# Patient Record
Sex: MALE | Race: WHITE | NOT HISPANIC OR LATINO | Employment: OTHER | URBAN - METROPOLITAN AREA
[De-identification: names, ages, dates, MRNs, and addresses within clinical notes are randomized per-mention and may not be internally consistent; named-entity substitution may affect disease eponyms.]

---

## 2017-10-02 ENCOUNTER — APPOINTMENT (OUTPATIENT)
Dept: RADIOLOGY | Facility: MEDICAL CENTER | Age: 82
End: 2017-10-02
Attending: EMERGENCY MEDICINE
Payer: COMMERCIAL

## 2017-10-02 ENCOUNTER — HOSPITAL ENCOUNTER (EMERGENCY)
Facility: MEDICAL CENTER | Age: 82
End: 2017-10-02
Attending: EMERGENCY MEDICINE
Payer: COMMERCIAL

## 2017-10-02 VITALS
DIASTOLIC BLOOD PRESSURE: 46 MMHG | OXYGEN SATURATION: 91 % | SYSTOLIC BLOOD PRESSURE: 127 MMHG | TEMPERATURE: 98.9 F | HEIGHT: 72 IN | RESPIRATION RATE: 19 BRPM | BODY MASS INDEX: 24.99 KG/M2 | WEIGHT: 184.53 LBS | HEART RATE: 83 BPM

## 2017-10-02 DIAGNOSIS — K59.00 CONSTIPATION, UNSPECIFIED CONSTIPATION TYPE: ICD-10-CM

## 2017-10-02 DIAGNOSIS — K62.89 PROCTITIS: ICD-10-CM

## 2017-10-02 LAB
ALBUMIN SERPL BCP-MCNC: 3.7 G/DL (ref 3.2–4.9)
ALBUMIN/GLOB SERPL: 1.2 G/DL
ALP SERPL-CCNC: 52 U/L (ref 30–99)
ALT SERPL-CCNC: 13 U/L (ref 2–50)
ANION GAP SERPL CALC-SCNC: 10 MMOL/L (ref 0–11.9)
APTT PPP: 36.5 SEC (ref 24.7–36)
AST SERPL-CCNC: 22 U/L (ref 12–45)
BASOPHILS # BLD AUTO: 0.4 % (ref 0–1.8)
BASOPHILS # BLD: 0.03 K/UL (ref 0–0.12)
BILIRUB SERPL-MCNC: 0.9 MG/DL (ref 0.1–1.5)
BUN SERPL-MCNC: 23 MG/DL (ref 8–22)
CALCIUM SERPL-MCNC: 9.1 MG/DL (ref 8.5–10.5)
CHLORIDE SERPL-SCNC: 105 MMOL/L (ref 96–112)
CO2 SERPL-SCNC: 20 MMOL/L (ref 20–33)
CREAT SERPL-MCNC: 1.16 MG/DL (ref 0.5–1.4)
EOSINOPHIL # BLD AUTO: 0.08 K/UL (ref 0–0.51)
EOSINOPHIL NFR BLD: 1 % (ref 0–6.9)
ERYTHROCYTE [DISTWIDTH] IN BLOOD BY AUTOMATED COUNT: 48.9 FL (ref 35.9–50)
GFR SERPL CREATININE-BSD FRML MDRD: 60 ML/MIN/1.73 M 2
GLOBULIN SER CALC-MCNC: 3 G/DL (ref 1.9–3.5)
GLUCOSE SERPL-MCNC: 89 MG/DL (ref 65–99)
HCT VFR BLD AUTO: 34.9 % (ref 42–52)
HGB BLD-MCNC: 11.9 G/DL (ref 14–18)
IMM GRANULOCYTES # BLD AUTO: 0.04 K/UL (ref 0–0.11)
IMM GRANULOCYTES NFR BLD AUTO: 0.5 % (ref 0–0.9)
INR PPP: 1.07 (ref 0.87–1.13)
LIPASE SERPL-CCNC: 6 U/L (ref 11–82)
LYMPHOCYTES # BLD AUTO: 0.75 K/UL (ref 1–4.8)
LYMPHOCYTES NFR BLD: 9.4 % (ref 22–41)
MCH RBC QN AUTO: 33.5 PG (ref 27–33)
MCHC RBC AUTO-ENTMCNC: 34.1 G/DL (ref 33.7–35.3)
MCV RBC AUTO: 98.3 FL (ref 81.4–97.8)
MONOCYTES # BLD AUTO: 0.66 K/UL (ref 0–0.85)
MONOCYTES NFR BLD AUTO: 8.3 % (ref 0–13.4)
NEUTROPHILS # BLD AUTO: 6.4 K/UL (ref 1.82–7.42)
NEUTROPHILS NFR BLD: 80.4 % (ref 44–72)
NRBC # BLD AUTO: 0 K/UL
NRBC BLD AUTO-RTO: 0 /100 WBC
PLATELET # BLD AUTO: 148 K/UL (ref 164–446)
PMV BLD AUTO: 11.2 FL (ref 9–12.9)
POTASSIUM SERPL-SCNC: 4 MMOL/L (ref 3.6–5.5)
PROT SERPL-MCNC: 6.7 G/DL (ref 6–8.2)
PROTHROMBIN TIME: 14.2 SEC (ref 12–14.6)
RBC # BLD AUTO: 3.55 M/UL (ref 4.7–6.1)
SODIUM SERPL-SCNC: 135 MMOL/L (ref 135–145)
WBC # BLD AUTO: 8 K/UL (ref 4.8–10.8)

## 2017-10-02 PROCEDURE — 83690 ASSAY OF LIPASE: CPT

## 2017-10-02 PROCEDURE — A9270 NON-COVERED ITEM OR SERVICE: HCPCS | Performed by: EMERGENCY MEDICINE

## 2017-10-02 PROCEDURE — 302255 BARRIER CREAM MOISTURE BAZA PROTECT (ZINC) 5OZ: Performed by: EMERGENCY MEDICINE

## 2017-10-02 PROCEDURE — 36415 COLL VENOUS BLD VENIPUNCTURE: CPT

## 2017-10-02 PROCEDURE — 85025 COMPLETE CBC W/AUTO DIFF WBC: CPT

## 2017-10-02 PROCEDURE — 99284 EMERGENCY DEPT VISIT MOD MDM: CPT

## 2017-10-02 PROCEDURE — 85610 PROTHROMBIN TIME: CPT

## 2017-10-02 PROCEDURE — 700102 HCHG RX REV CODE 250 W/ 637 OVERRIDE(OP): Performed by: EMERGENCY MEDICINE

## 2017-10-02 PROCEDURE — 700105 HCHG RX REV CODE 258: Performed by: EMERGENCY MEDICINE

## 2017-10-02 PROCEDURE — 82272 OCCULT BLD FECES 1-3 TESTS: CPT

## 2017-10-02 PROCEDURE — 80053 COMPREHEN METABOLIC PANEL: CPT

## 2017-10-02 PROCEDURE — 85730 THROMBOPLASTIN TIME PARTIAL: CPT

## 2017-10-02 PROCEDURE — 700101 HCHG RX REV CODE 250: Performed by: EMERGENCY MEDICINE

## 2017-10-02 PROCEDURE — 74022 RADEX COMPL AQT ABD SERIES: CPT

## 2017-10-02 RX ORDER — ENEMA 19; 7 G/133ML; G/133ML
1 ENEMA RECTAL ONCE
Status: COMPLETED | OUTPATIENT
Start: 2017-10-02 | End: 2017-10-02

## 2017-10-02 RX ORDER — SODIUM CHLORIDE 9 MG/ML
INJECTION, SOLUTION INTRAVENOUS CONTINUOUS
Status: DISCONTINUED | OUTPATIENT
Start: 2017-10-02 | End: 2017-10-02 | Stop reason: HOSPADM

## 2017-10-02 RX ORDER — ONDANSETRON 2 MG/ML
INJECTION INTRAMUSCULAR; INTRAVENOUS
Status: DISCONTINUED
Start: 2017-10-02 | End: 2017-10-02 | Stop reason: HOSPADM

## 2017-10-02 RX ORDER — ONDANSETRON 2 MG/ML
4 INJECTION INTRAMUSCULAR; INTRAVENOUS ONCE
Status: DISCONTINUED | OUTPATIENT
Start: 2017-10-02 | End: 2017-10-02

## 2017-10-02 RX ADMIN — SODIUM PHOSPHATE, DIBASIC AND SODIUM PHOSPHATE, MONOBASIC 133 ML: 7; 19 ENEMA RECTAL at 14:33

## 2017-10-02 RX ADMIN — LIDOCAINE HYDROCHLORIDE 1 APPLICATION: 20 JELLY TOPICAL at 10:27

## 2017-10-02 RX ADMIN — MAGNESIUM CITRATE 296 ML: 1.75 LIQUID ORAL at 12:00

## 2017-10-02 RX ADMIN — LIDOCAINE HYDROCHLORIDE 1 APPLICATION: 20 JELLY TOPICAL at 14:15

## 2017-10-02 RX ADMIN — SODIUM PHOSPHATE 133 ML: 7; 19 ENEMA RECTAL at 11:04

## 2017-10-02 RX ADMIN — SODIUM CHLORIDE: 9 INJECTION, SOLUTION INTRAVENOUS at 13:17

## 2017-10-02 NOTE — DISCHARGE INSTRUCTIONS
Constipation, Adult  Constipation is when a person:  · Poops (has a bowel movement) less than 3 times a week.  · Has a hard time pooping.  · Has poop that is dry, hard, or bigger than normal.  HOME CARE   · Eat foods with a lot of fiber in them. This includes fruits, vegetables, beans, and whole grains such as brown rice.  · Avoid fatty foods and foods with a lot of sugar. This includes french fries, hamburgers, cookies, candy, and soda.  · If you are not getting enough fiber from food, take products with added fiber in them (supplements).  · Drink enough fluid to keep your pee (urine) clear or pale yellow.  · Exercise on a regular basis, or as told by your doctor.  · Go to the restroom when you feel like you need to poop. Do not hold it.  · Only take medicine as told by your doctor. Do not take medicines that help you poop (laxatives) without talking to your doctor first.  GET HELP RIGHT AWAY IF:   · You have bright red blood in your poop (stool).  · Your constipation lasts more than 4 days or gets worse.  · You have belly (abdominal) or butt (rectal) pain.  · You have thin poop (as thin as a pencil).  · You lose weight, and it cannot be explained.  MAKE SURE YOU:   · Understand these instructions.  · Will watch your condition.  · Will get help right away if you are not doing well or get worse.     This information is not intended to replace advice given to you by your health care provider. Make sure you discuss any questions you have with your health care provider.     Document Released: 06/05/2009 Document Revised: 01/08/2016 Document Reviewed: 09/29/2014  [employer] Interactive Patient Education ©2016 [employer]      Proctitis  Proctitis is the swelling and soreness (inflammation) of the lining of the rectum. The rectum is at the end of the large intestine and is attached to the anus. The inflammation causes pain and discomfort. It may be short-term (acute) or long-lasting (chronic).  CAUSES  Inflammation in  the rectum can be caused by many things, such as:  · Sexually transmitted diseases (STDs).  · Infection.  · Anal-rectal trauma or injury.  · Ulcerative colitis or Crohn's disease.  · Radiation therapy directed near the rectum.  · Antibiotic therapy.  SYMPTOMS  · Sudden, uncomfortable, and frequent urge to have a bowel movement.  · Anal or rectal pain.  · Abdominal cramping or pain.  · Sensation that the rectum is full.  · Rectal bleeding.  · Pus or mucus discharge from anus.  · Diarrhea or frequent soft, loose stools.  DIAGNOSIS  Diagnosis may include the following:  · A history and physical exam.  · An STD test.  · Blood tests.  · Stool tests.  · Rectal culture.  · A procedure to evaluate the anal canal (anoscopy).  · Procedures to look at part, or the entire large bowel (sigmoidoscopy, colonoscopy).  TREATMENT  Treatment of proctitis depends on the cause. Reducing the symptoms of inflammation and eliminating infection are the main goals of treatment. Treatment may include:  · Home remedies and lifestyle, such as sitz baths and avoiding food right before bedtime.  · Topical ointments, foams, suppositories, or enemas, such as corticosteroids or anti-inflammatories.  · Antibiotic or antiviral medicines to treat infection or to control harmful bacteria.  · Medicines to control diarrhea, soften stools, and reduce pain.  · Medicines to suppress the immune system.  · Avoiding the activity that caused rectal trauma.  · Nutritional, dietary, or herbal supplements.  · Heat or laser therapy for persistent bleeding.  · A dilation procedure to enlarge a narrowed rectum.  · Surgery, though rare, may be necessary to repair damaged rectal lining.  If your proctitis was caused by an STD, your health care provider may test you for infection again 3 months after treatment.  HOME CARE INSTRUCTIONS  Only take medicines that are recommended or approved by your caregiver. Do not take anti-diarrhea medicine without your caregiver's  approval.  SEEK MEDICAL CARE IF:  · You often experience one or more of the symptoms noted above.  · You keep experiencing symptoms after treatment.  · You have questions or concerns about your symptoms or treatment plan.  MAKE SURE YOU:  · Understand these instructions.  · Will watch your condition.  · Will get help right away if you are not doing well or get worse.  FOR MORE INFORMATION  National [place] of Diabetes and Digestive and Kidney Disease (NIDDK): www.digestive.niddk.[facility].gov/     This information is not intended to replace advice given to you by your health care provider. Make sure you discuss any questions you have with your health care provider.     Document Released: 12/06/2012 Document Revised: 01/08/2016 Document Reviewed: 03/14/2016  [employer] Interactive Patient Education ©2016 [employer]

## 2017-10-02 NOTE — ED PROVIDER NOTES
ED Provider Note    Scribed for Krista Flores M.D. by Pipo Valenzuela. 10/2/2017  8:53 AM    Primary care provider: Nestor Jiang M.D.  Means of arrival: Walk-in  History obtained from: Patient and wife.   History limited by: None    CHIEF COMPLAINT  Chief Complaint   Patient presents with   • Rectal Pain     reports symptoms for three days/ pt reports started as constipation/ took mirilax and now having diarrhea/ rectal pain   • Constipation     HPI  Rc Auguste is a 85 y.o. male who presents to the Emergency Department for rectal pain and discharge onset 5 days ago. The patient states he was constipated for 2 days onset 7 days ago and medicated with Miralax without relief. He began taking Exlax afterwards and immediately began developing incontinence from the rectum that he describes as watery discharge with small amounts of stool. He has associated rectal pain after wiping, but denies any itching or blood after wiping. He also notes of lower abdominal pains to palpation, but otherwise denies any fever, diarrhea, dysuria, nausea, vomiting, chills, chest pain, shortness of breath, or abdominal pain. He has never experienced similar symptoms previously and has not medicated at home for pain relief. 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 He has past abdominal surgery of left hernia repair many years ago and appendectomy as a child. He has past history of A-fib, takes Amiodarone, and is seen by Dr. Deleon (Cardiology). He does not take any Aspirin. He does not have past history of hypertension, diabetes, or cancer. His last colonoscopy was 2 years ago, which was normal. He is a former smoker and drinks alcohol intermittently. He does not use illicit drugs. The patient was in the [employer] and denies any exposure to [place].     REVIEW OF SYSTEMS  CARDIAC: no chest pain  PULMONARY: no shortness of breath.   GI: Abdominal pain, no  vomiting, diarrhea.  : Rectal pain and discharge, constipation. No dysuria.   Musculoskeletal: Left rib pain.   Endocrine: no fevers, chills.     See history of present illness. All other systems are negative.  C.    PAST MEDICAL HISTORY   has a past medical history of Arrhythmia; Back pain; and Constipation.    SURGICAL HISTORY   has a past surgical history that includes other orthopedic surgery; other cardiac surgery; colonoscopy - endo (4/16/2011); lumbar laminectomy diskectomy (2014); and knee replacement, total (Right).    SOCIAL HISTORY  Social History   Substance Use Topics   • Smoking status: Former Smoker     Packs/day: 2.00     Years: 40.00     Types: Cigarettes     Quit date: 1/1/1966   • Smokeless tobacco: Never Used   • Alcohol use No      History   Drug Use No       FAMILY HISTORY  Family History   Problem Relation Age of Onset   • Cancer Mother      Liver CAncer   • Heart Disease Father        CURRENT MEDICATIONS  Home Medications     Reviewed by Katie Clifford R.N. (Registered Nurse) on 10/02/17 at 0852  Med List Status: Complete   Medication Last Dose Status   amiodarone (CORDARONE) 200 MG TABS 10/1/2017 Active   Ascorbic Acid (VITAMIN C) 500 MG CHEW 10/1/2017 Active   Calcium Carbonate-Vit D-Min (CALCIUM 1200 PO) 10/1/2017 Active   docosahexanoic acid (OMEGA 3) 1000 MG CAPS 10/1/2017 Active   Multiple Vitamin (MULTI-VITAMIN PO) 10/1/2017 Active                ALLERGIES  Allergies   Allergen Reactions   • Nkda [No Known Drug Allergy]        PHYSICAL EXAM  VITAL SIGNS: /46   Pulse 96   Temp 37.2 °C (98.9 °F)   Resp 18   Ht 1.829 m (6')   Wt 83.7 kg (184 lb 8.4 oz)   SpO2 96%   BMI 25.03 kg/m²     Constitutional: Well developed, Well nourished, No acute distress, Non-toxic appearance.   HEENT: Normocephalic, Atraumatic,  external ears normal, pharynx pink,  Mucous  Membranes moist, No rhinorrhea or mucosal edema  Eyes: PERRL, EOMI, Conjunctiva normal, No discharge.   Neck: Normal  range of motion, No tenderness, Supple, No stridor.   Lymphatic: No lymphadenopathy    Cardiovascular: Regular Rate and Rhythm, No murmurs,  rubs, or gallops.   Thorax & Lungs: Lungs clear to auscultation bilaterally, No respiratory distress, No wheezes, rhales or rhonchi, No chest wall tenderness.   Abdomen: Bowel sounds normal, Soft, suprapubic tenderness to bilateral lower quadrants with deep palpation, non distended,  No pulsatile masses., no rebound guarding or peritoneal signs.   Skin: Warm, Dry, see rectal exam   Rectal Exam: Noted external hemorrhoids,, inflamed perianal area, rectum is fully loaded with fecal matter. Hemoccult positive.  Back:  No CVA tenderness,  No spinal tenderness, bony crepitance, step offs, or instability.   Neurologic: Alert & oriented x 3, Normal motor function, Normal sensory function, No focal deficits noted. Normal reflexes. Normal Cranial Nerves.  Extremities: Equal, intact distal pulses, No cyanosis, clubbing or edema,  No tenderness.   Musculoskeletal: Good range of motion in all major joints. No tenderness to palpation or major deformities noted.     DIAGNOSTIC STUDIES / PROCEDURES    LABS  Results for orders placed or performed during the hospital encounter of 10/02/17   CBC WITH DIFFERENTIAL   Result Value Ref Range    WBC 8.0 4.8 - 10.8 K/uL    RBC 3.55 (L) 4.70 - 6.10 M/uL    Hemoglobin 11.9 (L) 14.0 - 18.0 g/dL    Hematocrit 34.9 (L) 42.0 - 52.0 %    MCV 98.3 (H) 81.4 - 97.8 fL    MCH 33.5 (H) 27.0 - 33.0 pg    MCHC 34.1 33.7 - 35.3 g/dL    RDW 48.9 35.9 - 50.0 fL    Platelet Count 148 (L) 164 - 446 K/uL    MPV 11.2 9.0 - 12.9 fL    Neutrophils-Polys 80.40 (H) 44.00 - 72.00 %    Lymphocytes 9.40 (L) 22.00 - 41.00 %    Monocytes 8.30 0.00 - 13.40 %    Eosinophils 1.00 0.00 - 6.90 %    Basophils 0.40 0.00 - 1.80 %    Immature Granulocytes 0.50 0.00 - 0.90 %    Nucleated RBC 0.00 /100 WBC    Neutrophils (Absolute) 6.40 1.82 - 7.42 K/uL    Lymphs (Absolute) 0.75 (L) 1.00 -  4.80 K/uL    Monos (Absolute) 0.66 0.00 - 0.85 K/uL    Eos (Absolute) 0.08 0.00 - 0.51 K/uL    Baso (Absolute) 0.03 0.00 - 0.12 K/uL    Immature Granulocytes (abs) 0.04 0.00 - 0.11 K/uL    NRBC (Absolute) 0.00 K/uL   COMP METABOLIC PANEL   Result Value Ref Range    Sodium 135 135 - 145 mmol/L    Potassium 4.0 3.6 - 5.5 mmol/L    Chloride 105 96 - 112 mmol/L    Co2 20 20 - 33 mmol/L    Anion Gap 10.0 0.0 - 11.9    Glucose 89 65 - 99 mg/dL    Bun 23 (H) 8 - 22 mg/dL    Creatinine 1.16 0.50 - 1.40 mg/dL    Calcium 9.1 8.5 - 10.5 mg/dL    AST(SGOT) 22 12 - 45 U/L    ALT(SGPT) 13 2 - 50 U/L    Alkaline Phosphatase 52 30 - 99 U/L    Total Bilirubin 0.9 0.1 - 1.5 mg/dL    Albumin 3.7 3.2 - 4.9 g/dL    Total Protein 6.7 6.0 - 8.2 g/dL    Globulin 3.0 1.9 - 3.5 g/dL    A-G Ratio 1.2 g/dL   LIPASE   Result Value Ref Range    Lipase 6 (L) 11 - 82 U/L   PROTHROMBIN TIME (INR)   Result Value Ref Range    PT 14.2 12.0 - 14.6 sec    INR 1.07 0.87 - 1.13   APTT   Result Value Ref Range    APTT 36.5 (H) 24.7 - 36.0 sec   ESTIMATED GFR   Result Value Ref Range    GFR If African American >60 >60 mL/min/1.73 m 2    GFR If Non African American 60 >60 mL/min/1.73 m 2   All labs reviewed by me.    RADIOLOGY  DX-ABDOMEN COMPLETE WITH AP OR PA CXR   Final Result      No evidence of obstruction, perforation or ileus.      Large amount of stool within the colon.      The radiologist's interpretation of all radiological studies have been reviewed by me.    COURSE & MEDICAL DECISION MAKING  Nursing notes, VS, PMSFHx reviewed in chart.    8:55 AM - Patient seen and examined at bedside by Resident Dr. Garcia. Patient will be treated with normal saline infusion for dehydration indicated by poor PO intake and diarrhea, fleet enema, and lidocaine jelly. Ordered DX-abdomen, CBC, CMP, lipase, urinalysis culture, prothrombin time, and APTT to evaluate his symptoms. The differential diagnoses include but are not limited to: constipation,  hemorrhoids, fissures, rectal colitis. Performed rectal exam, see above note for more details.     10:13 AM Patient was reevaluated at bedside by myself. Reviewed the patient's imaging result as shown above. Informed him and his wife regarding plan of care, which they understand and agree with.    11:41 AM Nursing staff informed me the patient has not had a bowel movement yet. He will be treated with magnesium citrate.     12:41 PM Reviewed the patient's lab results as shown above. Patient was reevaluated at bedside. He is resting comfortably in bed, but still has not had a bowel movement. Discussed lab results with the patient and informed them of the results shown above. With nursing assistance, disimpaction was performed with removal of some soft stool    12:45 PM Paged GI.    1:15 PM 1:23 PM I discussed the patient's case and the above findings with Dr. Darby (GI) who recommended the patient continue to receive suppositories and take GoLytely to relieve the constipation. He has agreed to follow up with the patient in his office.      1:23 PM Patient was reevaluated at bedside. Discussed discharged plan of care with the patient and family members, including my consult with Dr. Darby. The patient will be discharged with Lidocaine gel, More-med, and Golytely and should return if symptoms worsen or if new symptoms arise, such as fever or vomiting. The patient understands and agrees to plan.      Patient has had high blood pressure while in the emergency department, felt likely secondary to medical condition. Counseled patient to monitor blood pressure at home and follow up with primary care physician.     I independently evaluated the patient and repeated the important components of the history and physical.  I discussed the management with the resident.  I have reviewed and agree with the pertinent clinical information as above including history, exam, study findings and recommendations.     DISPOSITION:  Patient  will be discharged home in stable condition.    FOLLOW UP:  Simon Baker M.D.  [address] [place] [id number] [phone]    Call in 1 day  for recheck, to establish care      OUTPATIENT MEDICATIONS:  New Prescriptions    LIDOCAINE (XYLOCAINE) 2 % GEL    Apply 1 Application to affected area(s) as needed.    PHENYLEPHRINE (VICTORINA-MED) 0.25 % SUPPOSITORY    Insert 1 Suppository in rectum every 12 hours.    POLYETHYLENE GLYCOL-ELECTROLYTES (GOLYTELY) 236 GM RECON SOLN    Sip throughout today      FINAL IMPRESSION  1. Proctitis    2. Constipation, unspecified constipation type         I, Pipo Valenzuela (Scribe), am scribing for, and in the presence of, Krista Flores M.D..    Electronically signed by: Pipo Valenzuela (Alesia), 10/2/2017    IKrista M.D. personally performed the services described in this documentation, as scribed by Pipo Valenzuela in my presence, and it is both accurate and complete.    The note accurately reflects work and decisions made by me.  Krista Flores  10/2/2017  3:10 PM

## 2018-08-30 ENCOUNTER — HOSPITAL ENCOUNTER (OUTPATIENT)
Dept: LAB | Facility: MEDICAL CENTER | Age: 83
End: 2018-08-30
Attending: INTERNAL MEDICINE
Payer: COMMERCIAL

## 2018-08-30 LAB
ALBUMIN SERPL BCP-MCNC: 4.1 G/DL (ref 3.2–4.9)
ALBUMIN/GLOB SERPL: 1.4 G/DL
ALP SERPL-CCNC: 69 U/L (ref 30–99)
ALT SERPL-CCNC: 9 U/L (ref 2–50)
ANION GAP SERPL CALC-SCNC: 9 MMOL/L (ref 0–11.9)
AST SERPL-CCNC: 13 U/L (ref 12–45)
BASOPHILS # BLD AUTO: 1 % (ref 0–1.8)
BASOPHILS # BLD: 0.05 K/UL (ref 0–0.12)
BILIRUB SERPL-MCNC: 0.5 MG/DL (ref 0.1–1.5)
BUN SERPL-MCNC: 24 MG/DL (ref 8–22)
CALCIUM SERPL-MCNC: 9.3 MG/DL (ref 8.5–10.5)
CHLORIDE SERPL-SCNC: 106 MMOL/L (ref 96–112)
CHOLEST SERPL-MCNC: 174 MG/DL (ref 100–199)
CO2 SERPL-SCNC: 24 MMOL/L (ref 20–33)
CREAT SERPL-MCNC: 1.22 MG/DL (ref 0.5–1.4)
EOSINOPHIL # BLD AUTO: 0.12 K/UL (ref 0–0.51)
EOSINOPHIL NFR BLD: 2.4 % (ref 0–6.9)
ERYTHROCYTE [DISTWIDTH] IN BLOOD BY AUTOMATED COUNT: 50.9 FL (ref 35.9–50)
GLOBULIN SER CALC-MCNC: 3 G/DL (ref 1.9–3.5)
GLUCOSE SERPL-MCNC: 90 MG/DL (ref 65–99)
HCT VFR BLD AUTO: 40.8 % (ref 42–52)
HDLC SERPL-MCNC: 35 MG/DL
HGB BLD-MCNC: 13.6 G/DL (ref 14–18)
IMM GRANULOCYTES # BLD AUTO: 0.04 K/UL (ref 0–0.11)
IMM GRANULOCYTES NFR BLD AUTO: 0.8 % (ref 0–0.9)
LDLC SERPL CALC-MCNC: 118 MG/DL
LYMPHOCYTES # BLD AUTO: 0.87 K/UL (ref 1–4.8)
LYMPHOCYTES NFR BLD: 17.2 % (ref 22–41)
MCH RBC QN AUTO: 33.4 PG (ref 27–33)
MCHC RBC AUTO-ENTMCNC: 33.3 G/DL (ref 33.7–35.3)
MCV RBC AUTO: 100.2 FL (ref 81.4–97.8)
MONOCYTES # BLD AUTO: 0.45 K/UL (ref 0–0.85)
MONOCYTES NFR BLD AUTO: 8.9 % (ref 0–13.4)
NEUTROPHILS # BLD AUTO: 3.52 K/UL (ref 1.82–7.42)
NEUTROPHILS NFR BLD: 69.7 % (ref 44–72)
NRBC # BLD AUTO: 0 K/UL
NRBC BLD-RTO: 0 /100 WBC
PLATELET # BLD AUTO: 167 K/UL (ref 164–446)
PMV BLD AUTO: 11.2 FL (ref 9–12.9)
POTASSIUM SERPL-SCNC: 4 MMOL/L (ref 3.6–5.5)
PROT SERPL-MCNC: 7.1 G/DL (ref 6–8.2)
RBC # BLD AUTO: 4.07 M/UL (ref 4.7–6.1)
SODIUM SERPL-SCNC: 139 MMOL/L (ref 135–145)
TRIGL SERPL-MCNC: 106 MG/DL (ref 0–149)
TSH SERPL DL<=0.005 MIU/L-ACNC: 9.17 UIU/ML (ref 0.38–5.33)
WBC # BLD AUTO: 5.1 K/UL (ref 4.8–10.8)

## 2018-08-30 PROCEDURE — 85025 COMPLETE CBC W/AUTO DIFF WBC: CPT

## 2018-08-30 PROCEDURE — 36415 COLL VENOUS BLD VENIPUNCTURE: CPT

## 2018-08-30 PROCEDURE — 80061 LIPID PANEL: CPT

## 2018-08-30 PROCEDURE — 80053 COMPREHEN METABOLIC PANEL: CPT

## 2018-08-30 PROCEDURE — 84443 ASSAY THYROID STIM HORMONE: CPT

## 2018-09-28 ENCOUNTER — HOSPITAL ENCOUNTER (OUTPATIENT)
Dept: LAB | Facility: MEDICAL CENTER | Age: 83
End: 2018-09-28
Attending: INTERNAL MEDICINE
Payer: COMMERCIAL

## 2018-09-28 PROCEDURE — 84439 ASSAY OF FREE THYROXINE: CPT

## 2018-09-28 PROCEDURE — 84443 ASSAY THYROID STIM HORMONE: CPT

## 2018-09-28 PROCEDURE — 36415 COLL VENOUS BLD VENIPUNCTURE: CPT

## 2018-09-29 LAB
T4 FREE SERPL-MCNC: 1.12 NG/DL (ref 0.53–1.43)
TSH SERPL DL<=0.005 MIU/L-ACNC: 6.17 UIU/ML (ref 0.38–5.33)

## 2018-12-03 ENCOUNTER — HOSPITAL ENCOUNTER (OUTPATIENT)
Dept: LAB | Facility: MEDICAL CENTER | Age: 83
End: 2018-12-03
Attending: INTERNAL MEDICINE
Payer: COMMERCIAL

## 2018-12-03 LAB
T4 FREE SERPL-MCNC: 1.2 NG/DL (ref 0.53–1.43)
TSH SERPL DL<=0.005 MIU/L-ACNC: 4.33 UIU/ML (ref 0.38–5.33)

## 2018-12-03 PROCEDURE — 36415 COLL VENOUS BLD VENIPUNCTURE: CPT

## 2018-12-03 PROCEDURE — 84443 ASSAY THYROID STIM HORMONE: CPT

## 2018-12-03 PROCEDURE — 84439 ASSAY OF FREE THYROXINE: CPT

## 2019-03-12 DIAGNOSIS — Z01.812 PRE-PROCEDURAL LABORATORY EXAMINATION: ICD-10-CM

## 2019-03-12 DIAGNOSIS — Z01.810 PRE-OPERATIVE CARDIOVASCULAR EXAMINATION: ICD-10-CM

## 2019-03-12 LAB
ANION GAP SERPL CALC-SCNC: 12 MMOL/L (ref 0–11.9)
BASOPHILS # BLD AUTO: 0.9 % (ref 0–1.8)
BASOPHILS # BLD: 0.06 K/UL (ref 0–0.12)
BUN SERPL-MCNC: 27 MG/DL (ref 8–22)
CALCIUM SERPL-MCNC: 10.1 MG/DL (ref 8.5–10.5)
CHLORIDE SERPL-SCNC: 107 MMOL/L (ref 96–112)
CO2 SERPL-SCNC: 22 MMOL/L (ref 20–33)
CREAT SERPL-MCNC: 1.24 MG/DL (ref 0.5–1.4)
EKG IMPRESSION: NORMAL
EOSINOPHIL # BLD AUTO: 0.09 K/UL (ref 0–0.51)
EOSINOPHIL NFR BLD: 1.3 % (ref 0–6.9)
ERYTHROCYTE [DISTWIDTH] IN BLOOD BY AUTOMATED COUNT: 52.5 FL (ref 35.9–50)
GLUCOSE SERPL-MCNC: 85 MG/DL (ref 65–99)
HCT VFR BLD AUTO: 41.7 % (ref 42–52)
HGB BLD-MCNC: 13.9 G/DL (ref 14–18)
IMM GRANULOCYTES # BLD AUTO: 0.04 K/UL (ref 0–0.11)
IMM GRANULOCYTES NFR BLD AUTO: 0.6 % (ref 0–0.9)
LYMPHOCYTES # BLD AUTO: 1.18 K/UL (ref 1–4.8)
LYMPHOCYTES NFR BLD: 17.2 % (ref 22–41)
MCH RBC QN AUTO: 33.7 PG (ref 27–33)
MCHC RBC AUTO-ENTMCNC: 33.3 G/DL (ref 33.7–35.3)
MCV RBC AUTO: 101.2 FL (ref 81.4–97.8)
MONOCYTES # BLD AUTO: 0.56 K/UL (ref 0–0.85)
MONOCYTES NFR BLD AUTO: 8.2 % (ref 0–13.4)
NEUTROPHILS # BLD AUTO: 4.92 K/UL (ref 1.82–7.42)
NEUTROPHILS NFR BLD: 71.8 % (ref 44–72)
NRBC # BLD AUTO: 0 K/UL
NRBC BLD-RTO: 0 /100 WBC
PLATELET # BLD AUTO: 177 K/UL (ref 164–446)
PMV BLD AUTO: 10.9 FL (ref 9–12.9)
POTASSIUM SERPL-SCNC: 3.9 MMOL/L (ref 3.6–5.5)
RBC # BLD AUTO: 4.12 M/UL (ref 4.7–6.1)
SCCMEC + MECA PNL NOSE NAA+PROBE: NEGATIVE
SCCMEC + MECA PNL NOSE NAA+PROBE: POSITIVE
SODIUM SERPL-SCNC: 141 MMOL/L (ref 135–145)
WBC # BLD AUTO: 6.9 K/UL (ref 4.8–10.8)

## 2019-03-12 PROCEDURE — 87641 MR-STAPH DNA AMP PROBE: CPT

## 2019-03-12 PROCEDURE — 93005 ELECTROCARDIOGRAM TRACING: CPT

## 2019-03-12 PROCEDURE — 80048 BASIC METABOLIC PNL TOTAL CA: CPT

## 2019-03-12 PROCEDURE — 36415 COLL VENOUS BLD VENIPUNCTURE: CPT

## 2019-03-12 PROCEDURE — 87640 STAPH A DNA AMP PROBE: CPT

## 2019-03-12 PROCEDURE — 93010 ELECTROCARDIOGRAM REPORT: CPT | Performed by: INTERNAL MEDICINE

## 2019-03-12 PROCEDURE — 85025 COMPLETE CBC W/AUTO DIFF WBC: CPT

## 2019-03-12 RX ORDER — LEVOTHYROXINE SODIUM 0.05 MG/1
50 TABLET ORAL
COMMUNITY

## 2019-03-12 NOTE — DISCHARGE PLANNING
DISCHARGE PLANNING NOTE - TOTAL JOINT     Procedure: Procedure(s):  KNEE ARTHROPLASTY TOTAL  Procedure Date: 3/20/2019  Insurance:  Payor: [employer] / Plan: [employer] MEDICARE ADVANTAGE   Equipment currently available at home? cane, front-wheel walker and raised toilet seat  Steps into the home? 2  Steps within the home? 1  Toilet height? Standard  Type of shower? tub-shower  Who will be with you during your recovery? spouse  Is Outpatient Physical Therapy set up after surgery? Yes  Did you take the Total Joint Class and where? No, declined written information. Previous joint replacements.      Plan: Reviewed Equipment Resource list and provided a copy to the patient. Recommended a tub transfer bench. Provided Home Safety Checklist. Anticipate discharge home.

## 2019-03-12 NOTE — OR NURSING
During pre admit appointment today pt denied any symptoms of burning with urination or urinary frequency, UA not indicated.  Patient verbalized an understanding that Dr. Mary's office will notify him if the nasal done today is positive.

## 2019-03-19 ENCOUNTER — ANESTHESIA EVENT (OUTPATIENT)
Dept: SURGERY | Facility: MEDICAL CENTER | Age: 84
End: 2019-03-19
Payer: COMMERCIAL

## 2019-03-20 ENCOUNTER — HOSPITAL ENCOUNTER (OUTPATIENT)
Facility: MEDICAL CENTER | Age: 84
End: 2019-03-21
Attending: ORTHOPAEDIC SURGERY | Admitting: ORTHOPAEDIC SURGERY
Payer: COMMERCIAL

## 2019-03-20 ENCOUNTER — ANESTHESIA (OUTPATIENT)
Dept: SURGERY | Facility: MEDICAL CENTER | Age: 84
End: 2019-03-20
Payer: COMMERCIAL

## 2019-03-20 ENCOUNTER — APPOINTMENT (OUTPATIENT)
Dept: RADIOLOGY | Facility: MEDICAL CENTER | Age: 84
End: 2019-03-20
Attending: PHYSICIAN ASSISTANT
Payer: COMMERCIAL

## 2019-03-20 DIAGNOSIS — Z96.652 STATUS POST LEFT KNEE REPLACEMENT: Chronic | ICD-10-CM

## 2019-03-20 LAB
ABO GROUP BLD: NORMAL
BLD GP AB SCN SERPL QL: NORMAL
RH BLD: NORMAL

## 2019-03-20 PROCEDURE — 160022 HCHG BLOCK: Performed by: ORTHOPAEDIC SURGERY

## 2019-03-20 PROCEDURE — A9270 NON-COVERED ITEM OR SERVICE: HCPCS | Performed by: PHYSICIAN ASSISTANT

## 2019-03-20 PROCEDURE — 96375 TX/PRO/DX INJ NEW DRUG ADDON: CPT

## 2019-03-20 PROCEDURE — 700102 HCHG RX REV CODE 250 W/ 637 OVERRIDE(OP): Performed by: PHYSICIAN ASSISTANT

## 2019-03-20 PROCEDURE — 700111 HCHG RX REV CODE 636 W/ 250 OVERRIDE (IP)

## 2019-03-20 PROCEDURE — 700111 HCHG RX REV CODE 636 W/ 250 OVERRIDE (IP): Performed by: PHYSICIAN ASSISTANT

## 2019-03-20 PROCEDURE — 502000 HCHG MISC OR IMPLANTS RC 0278: Performed by: ORTHOPAEDIC SURGERY

## 2019-03-20 PROCEDURE — 700111 HCHG RX REV CODE 636 W/ 250 OVERRIDE (IP): Performed by: ORTHOPAEDIC SURGERY

## 2019-03-20 PROCEDURE — 160041 HCHG SURGERY MINUTES - EA ADDL 1 MIN LEVEL 4: Performed by: ORTHOPAEDIC SURGERY

## 2019-03-20 PROCEDURE — 501838 HCHG SUTURE GENERAL: Performed by: ORTHOPAEDIC SURGERY

## 2019-03-20 PROCEDURE — 86900 BLOOD TYPING SEROLOGIC ABO: CPT

## 2019-03-20 PROCEDURE — 86901 BLOOD TYPING SEROLOGIC RH(D): CPT

## 2019-03-20 PROCEDURE — G0378 HOSPITAL OBSERVATION PER HR: HCPCS

## 2019-03-20 PROCEDURE — 96365 THER/PROPH/DIAG IV INF INIT: CPT | Mod: XU

## 2019-03-20 PROCEDURE — 700101 HCHG RX REV CODE 250: Performed by: PHYSICIAN ASSISTANT

## 2019-03-20 PROCEDURE — 160029 HCHG SURGERY MINUTES - 1ST 30 MINS LEVEL 4: Performed by: ORTHOPAEDIC SURGERY

## 2019-03-20 PROCEDURE — 160009 HCHG ANES TIME/MIN: Performed by: ORTHOPAEDIC SURGERY

## 2019-03-20 PROCEDURE — 700112 HCHG RX REV CODE 229: Performed by: PHYSICIAN ASSISTANT

## 2019-03-20 PROCEDURE — 160048 HCHG OR STATISTICAL LEVEL 1-5: Performed by: ORTHOPAEDIC SURGERY

## 2019-03-20 PROCEDURE — 502579 HCHG PACK, TOTAL KNEE: Performed by: ORTHOPAEDIC SURGERY

## 2019-03-20 PROCEDURE — A9270 NON-COVERED ITEM OR SERVICE: HCPCS | Performed by: ANESTHESIOLOGY

## 2019-03-20 PROCEDURE — 90670 PCV13 VACCINE IM: CPT | Performed by: ORTHOPAEDIC SURGERY

## 2019-03-20 PROCEDURE — 160035 HCHG PACU - 1ST 60 MINS PHASE I: Performed by: ORTHOPAEDIC SURGERY

## 2019-03-20 PROCEDURE — 86850 RBC ANTIBODY SCREEN: CPT

## 2019-03-20 PROCEDURE — 700111 HCHG RX REV CODE 636 W/ 250 OVERRIDE (IP): Performed by: ANESTHESIOLOGY

## 2019-03-20 PROCEDURE — 700101 HCHG RX REV CODE 250: Performed by: ANESTHESIOLOGY

## 2019-03-20 PROCEDURE — 90471 IMMUNIZATION ADMIN: CPT

## 2019-03-20 PROCEDURE — 700101 HCHG RX REV CODE 250

## 2019-03-20 PROCEDURE — 73560 X-RAY EXAM OF KNEE 1 OR 2: CPT | Mod: LT

## 2019-03-20 PROCEDURE — 302128 INFUSION PUMP: Performed by: ORTHOPAEDIC SURGERY

## 2019-03-20 PROCEDURE — L8699 PROSTHETIC IMPLANT NOS: HCPCS | Performed by: ORTHOPAEDIC SURGERY

## 2019-03-20 PROCEDURE — 700102 HCHG RX REV CODE 250 W/ 637 OVERRIDE(OP): Performed by: ANESTHESIOLOGY

## 2019-03-20 PROCEDURE — 160002 HCHG RECOVERY MINUTES (STAT): Performed by: ORTHOPAEDIC SURGERY

## 2019-03-20 DEVICE — IMPLANTABLE DEVICE: Type: IMPLANTABLE DEVICE | Site: KNEE | Status: FUNCTIONAL

## 2019-03-20 DEVICE — PATELLA RESURFACING PAT 38MM: Type: IMPLANTABLE DEVICE | Site: KNEE | Status: FUNCTIONAL

## 2019-03-20 DEVICE — BONE CEMENT SIMPLEX FULL DOSE - (10EA/PK): Type: IMPLANTABLE DEVICE | Site: KNEE | Status: FUNCTIONAL

## 2019-03-20 DEVICE — IMPLANT GNS II CMT TIB SIZE 7 LEFT: Type: IMPLANTABLE DEVICE | Site: KNEE | Status: FUNCTIONAL

## 2019-03-20 RX ORDER — AMOXICILLIN 250 MG
1 CAPSULE ORAL NIGHTLY
Status: DISCONTINUED | OUTPATIENT
Start: 2019-03-20 | End: 2019-03-21 | Stop reason: HOSPADM

## 2019-03-20 RX ORDER — SODIUM CHLORIDE, SODIUM LACTATE, POTASSIUM CHLORIDE, CALCIUM CHLORIDE 600; 310; 30; 20 MG/100ML; MG/100ML; MG/100ML; MG/100ML
INJECTION, SOLUTION INTRAVENOUS CONTINUOUS
Status: ACTIVE | OUTPATIENT
Start: 2019-03-20 | End: 2019-03-20

## 2019-03-20 RX ORDER — SODIUM CHLORIDE, SODIUM LACTATE, POTASSIUM CHLORIDE, CALCIUM CHLORIDE 600; 310; 30; 20 MG/100ML; MG/100ML; MG/100ML; MG/100ML
INJECTION, SOLUTION INTRAVENOUS CONTINUOUS
Status: DISCONTINUED | OUTPATIENT
Start: 2019-03-20 | End: 2019-03-20 | Stop reason: HOSPADM

## 2019-03-20 RX ORDER — HYDROCODONE BITARTRATE AND ACETAMINOPHEN 10; 325 MG/1; MG/1
.5-1 TABLET ORAL EVERY 4 HOURS PRN
Status: DISCONTINUED | OUTPATIENT
Start: 2019-03-20 | End: 2019-03-21 | Stop reason: HOSPADM

## 2019-03-20 RX ORDER — ENEMA 19; 7 G/133ML; G/133ML
1 ENEMA RECTAL
Status: DISCONTINUED | OUTPATIENT
Start: 2019-03-20 | End: 2019-03-21 | Stop reason: HOSPADM

## 2019-03-20 RX ORDER — TRANEXAMIC ACID 100 MG/ML
INJECTION, SOLUTION INTRAVENOUS PRN
Status: DISCONTINUED | OUTPATIENT
Start: 2019-03-20 | End: 2019-03-20 | Stop reason: SURG

## 2019-03-20 RX ORDER — ACETAMINOPHEN 325 MG/1
650 TABLET ORAL EVERY 6 HOURS
Status: DISCONTINUED | OUTPATIENT
Start: 2019-03-20 | End: 2019-03-21 | Stop reason: HOSPADM

## 2019-03-20 RX ORDER — CEFAZOLIN SODIUM 2 G/100ML
2 INJECTION, SOLUTION INTRAVENOUS ONCE
Status: DISCONTINUED | OUTPATIENT
Start: 2019-03-20 | End: 2019-03-20 | Stop reason: HOSPADM

## 2019-03-20 RX ORDER — POLYETHYLENE GLYCOL 3350 17 G/17G
1 POWDER, FOR SOLUTION ORAL 2 TIMES DAILY PRN
Status: DISCONTINUED | OUTPATIENT
Start: 2019-03-20 | End: 2019-03-21 | Stop reason: HOSPADM

## 2019-03-20 RX ORDER — PHENYLEPHRINE HYDROCHLORIDE 10 MG/ML
INJECTION, SOLUTION INTRAMUSCULAR; INTRAVENOUS; SUBCUTANEOUS PRN
Status: DISCONTINUED | OUTPATIENT
Start: 2019-03-20 | End: 2019-03-20 | Stop reason: SURG

## 2019-03-20 RX ORDER — HYDROMORPHONE HYDROCHLORIDE 1 MG/ML
0.4 INJECTION, SOLUTION INTRAMUSCULAR; INTRAVENOUS; SUBCUTANEOUS
Status: DISCONTINUED | OUTPATIENT
Start: 2019-03-20 | End: 2019-03-20 | Stop reason: HOSPADM

## 2019-03-20 RX ORDER — DEXAMETHASONE SODIUM PHOSPHATE 4 MG/ML
4 INJECTION, SOLUTION INTRA-ARTICULAR; INTRALESIONAL; INTRAMUSCULAR; INTRAVENOUS; SOFT TISSUE
Status: DISCONTINUED | OUTPATIENT
Start: 2019-03-20 | End: 2019-03-21 | Stop reason: HOSPADM

## 2019-03-20 RX ORDER — DIPHENHYDRAMINE HYDROCHLORIDE 50 MG/ML
25 INJECTION INTRAMUSCULAR; INTRAVENOUS EVERY 6 HOURS PRN
Status: DISCONTINUED | OUTPATIENT
Start: 2019-03-20 | End: 2019-03-21 | Stop reason: HOSPADM

## 2019-03-20 RX ORDER — PROMETHAZINE HYDROCHLORIDE 25 MG/1
25 SUPPOSITORY RECTAL EVERY 6 HOURS PRN
Status: DISCONTINUED | OUTPATIENT
Start: 2019-03-20 | End: 2019-03-21 | Stop reason: HOSPADM

## 2019-03-20 RX ORDER — BUPIVACAINE HYDROCHLORIDE AND EPINEPHRINE 2.5; 5 MG/ML; UG/ML
INJECTION, SOLUTION EPIDURAL; INFILTRATION; INTRACAUDAL; PERINEURAL
Status: DISCONTINUED | OUTPATIENT
Start: 2019-03-20 | End: 2019-03-20 | Stop reason: HOSPADM

## 2019-03-20 RX ORDER — BISACODYL 10 MG
10 SUPPOSITORY, RECTAL RECTAL
Status: DISCONTINUED | OUTPATIENT
Start: 2019-03-20 | End: 2019-03-21 | Stop reason: HOSPADM

## 2019-03-20 RX ORDER — DEXAMETHASONE SODIUM PHOSPHATE 4 MG/ML
INJECTION, SOLUTION INTRA-ARTICULAR; INTRALESIONAL; INTRAMUSCULAR; INTRAVENOUS; SOFT TISSUE PRN
Status: DISCONTINUED | OUTPATIENT
Start: 2019-03-20 | End: 2019-03-20 | Stop reason: SURG

## 2019-03-20 RX ORDER — CEFAZOLIN SODIUM 2 G/100ML
2 INJECTION, SOLUTION INTRAVENOUS EVERY 8 HOURS
Status: COMPLETED | OUTPATIENT
Start: 2019-03-20 | End: 2019-03-21

## 2019-03-20 RX ORDER — DIAZEPAM 5 MG/1
5 TABLET ORAL EVERY 6 HOURS PRN
Status: DISCONTINUED | OUTPATIENT
Start: 2019-03-20 | End: 2019-03-21 | Stop reason: HOSPADM

## 2019-03-20 RX ORDER — ONDANSETRON 4 MG/1
4 TABLET, ORALLY DISINTEGRATING ORAL EVERY 4 HOURS PRN
Status: DISCONTINUED | OUTPATIENT
Start: 2019-03-20 | End: 2019-03-21 | Stop reason: HOSPADM

## 2019-03-20 RX ORDER — CELECOXIB 200 MG/1
200 CAPSULE ORAL ONCE
Status: COMPLETED | OUTPATIENT
Start: 2019-03-20 | End: 2019-03-20

## 2019-03-20 RX ORDER — DIPHENHYDRAMINE HCL 25 MG
25 TABLET ORAL EVERY 6 HOURS PRN
Status: DISCONTINUED | OUTPATIENT
Start: 2019-03-20 | End: 2019-03-21 | Stop reason: HOSPADM

## 2019-03-20 RX ORDER — HYDROMORPHONE HYDROCHLORIDE 1 MG/ML
0.1 INJECTION, SOLUTION INTRAMUSCULAR; INTRAVENOUS; SUBCUTANEOUS
Status: DISCONTINUED | OUTPATIENT
Start: 2019-03-20 | End: 2019-03-20 | Stop reason: HOSPADM

## 2019-03-20 RX ORDER — CHLORPROMAZINE HYDROCHLORIDE 10 MG/1
25 TABLET, FILM COATED ORAL EVERY 6 HOURS PRN
Status: DISCONTINUED | OUTPATIENT
Start: 2019-03-20 | End: 2019-03-21 | Stop reason: HOSPADM

## 2019-03-20 RX ORDER — BUPIVACAINE HYDROCHLORIDE 2.5 MG/ML
INJECTION, SOLUTION EPIDURAL; INFILTRATION; INTRACAUDAL PRN
Status: DISCONTINUED | OUTPATIENT
Start: 2019-03-20 | End: 2019-03-20 | Stop reason: SURG

## 2019-03-20 RX ORDER — PROCHLORPERAZINE MALEATE 10 MG
10 TABLET ORAL EVERY 6 HOURS PRN
Status: DISCONTINUED | OUTPATIENT
Start: 2019-03-20 | End: 2019-03-21 | Stop reason: HOSPADM

## 2019-03-20 RX ORDER — EPINEPHRINE 1 MG/ML(1)
AMPUL (ML) INJECTION PRN
Status: DISCONTINUED | OUTPATIENT
Start: 2019-03-20 | End: 2019-03-20 | Stop reason: SURG

## 2019-03-20 RX ORDER — ACETAMINOPHEN 500 MG
1000 TABLET ORAL ONCE
Status: COMPLETED | OUTPATIENT
Start: 2019-03-20 | End: 2019-03-20

## 2019-03-20 RX ORDER — ONDANSETRON 2 MG/ML
INJECTION INTRAMUSCULAR; INTRAVENOUS PRN
Status: DISCONTINUED | OUTPATIENT
Start: 2019-03-20 | End: 2019-03-20 | Stop reason: SURG

## 2019-03-20 RX ORDER — LEVOTHYROXINE SODIUM 0.03 MG/1
50 TABLET ORAL
Status: DISCONTINUED | OUTPATIENT
Start: 2019-03-21 | End: 2019-03-21 | Stop reason: HOSPADM

## 2019-03-20 RX ORDER — HALOPERIDOL 5 MG/ML
1 INJECTION INTRAMUSCULAR
Status: DISCONTINUED | OUTPATIENT
Start: 2019-03-20 | End: 2019-03-20 | Stop reason: HOSPADM

## 2019-03-20 RX ORDER — HYDROMORPHONE HYDROCHLORIDE 1 MG/ML
0.2 INJECTION, SOLUTION INTRAMUSCULAR; INTRAVENOUS; SUBCUTANEOUS
Status: DISCONTINUED | OUTPATIENT
Start: 2019-03-20 | End: 2019-03-20 | Stop reason: HOSPADM

## 2019-03-20 RX ORDER — AMIODARONE HYDROCHLORIDE 100 MG/1
100 TABLET ORAL
COMMUNITY

## 2019-03-20 RX ORDER — ONDANSETRON 2 MG/ML
4 INJECTION INTRAMUSCULAR; INTRAVENOUS
Status: DISCONTINUED | OUTPATIENT
Start: 2019-03-20 | End: 2019-03-20 | Stop reason: HOSPADM

## 2019-03-20 RX ORDER — CHLORPROMAZINE HYDROCHLORIDE 25 MG/ML
25 INJECTION INTRAMUSCULAR EVERY 6 HOURS PRN
Status: DISCONTINUED | OUTPATIENT
Start: 2019-03-20 | End: 2019-03-21 | Stop reason: HOSPADM

## 2019-03-20 RX ORDER — SCOLOPAMINE TRANSDERMAL SYSTEM 1 MG/1
1 PATCH, EXTENDED RELEASE TRANSDERMAL
Status: DISCONTINUED | OUTPATIENT
Start: 2019-03-20 | End: 2019-03-21 | Stop reason: HOSPADM

## 2019-03-20 RX ORDER — LIDOCAINE HYDROCHLORIDE 10 MG/ML
INJECTION, SOLUTION EPIDURAL; INFILTRATION; INTRACAUDAL; PERINEURAL
Status: COMPLETED
Start: 2019-03-20 | End: 2019-03-20

## 2019-03-20 RX ORDER — TAMSULOSIN HYDROCHLORIDE 0.4 MG/1
0.4 CAPSULE ORAL DAILY
Status: DISCONTINUED | OUTPATIENT
Start: 2019-03-20 | End: 2019-03-21 | Stop reason: HOSPADM

## 2019-03-20 RX ORDER — TRAMADOL HYDROCHLORIDE 50 MG/1
50 TABLET ORAL EVERY 4 HOURS PRN
Status: DISCONTINUED | OUTPATIENT
Start: 2019-03-20 | End: 2019-03-21 | Stop reason: HOSPADM

## 2019-03-20 RX ORDER — HYDRALAZINE HYDROCHLORIDE 20 MG/ML
5 INJECTION INTRAMUSCULAR; INTRAVENOUS
Status: DISCONTINUED | OUTPATIENT
Start: 2019-03-20 | End: 2019-03-20 | Stop reason: HOSPADM

## 2019-03-20 RX ORDER — DEXTROSE AND SODIUM CHLORIDE 5; .45 G/100ML; G/100ML
INJECTION, SOLUTION INTRAVENOUS CONTINUOUS
Status: DISCONTINUED | OUTPATIENT
Start: 2019-03-20 | End: 2019-03-21 | Stop reason: HOSPADM

## 2019-03-20 RX ORDER — ONDANSETRON 2 MG/ML
4 INJECTION INTRAMUSCULAR; INTRAVENOUS EVERY 4 HOURS PRN
Status: DISCONTINUED | OUTPATIENT
Start: 2019-03-20 | End: 2019-03-21 | Stop reason: HOSPADM

## 2019-03-20 RX ORDER — HYDROMORPHONE HYDROCHLORIDE 1 MG/ML
0.5 INJECTION, SOLUTION INTRAMUSCULAR; INTRAVENOUS; SUBCUTANEOUS
Status: DISCONTINUED | OUTPATIENT
Start: 2019-03-20 | End: 2019-03-21 | Stop reason: HOSPADM

## 2019-03-20 RX ORDER — OXYCODONE HCL 5 MG/5 ML
5 SOLUTION, ORAL ORAL
Status: COMPLETED | OUTPATIENT
Start: 2019-03-20 | End: 2019-03-20

## 2019-03-20 RX ORDER — ZOLPIDEM TARTRATE 5 MG/1
5 TABLET ORAL NIGHTLY PRN
Status: DISCONTINUED | OUTPATIENT
Start: 2019-03-21 | End: 2019-03-21 | Stop reason: HOSPADM

## 2019-03-20 RX ORDER — HALOPERIDOL 5 MG/ML
1 INJECTION INTRAMUSCULAR EVERY 6 HOURS PRN
Status: DISCONTINUED | OUTPATIENT
Start: 2019-03-20 | End: 2019-03-21 | Stop reason: HOSPADM

## 2019-03-20 RX ORDER — AMIODARONE HYDROCHLORIDE 200 MG/1
200 TABLET ORAL EVERY EVENING
Status: DISCONTINUED | OUTPATIENT
Start: 2019-03-20 | End: 2019-03-21 | Stop reason: HOSPADM

## 2019-03-20 RX ORDER — KETOROLAC TROMETHAMINE 30 MG/ML
15 INJECTION, SOLUTION INTRAMUSCULAR; INTRAVENOUS EVERY 12 HOURS
Status: DISCONTINUED | OUTPATIENT
Start: 2019-03-20 | End: 2019-03-21 | Stop reason: HOSPADM

## 2019-03-20 RX ORDER — KETOROLAC TROMETHAMINE 30 MG/ML
INJECTION, SOLUTION INTRAMUSCULAR; INTRAVENOUS
Status: DISCONTINUED | OUTPATIENT
Start: 2019-03-20 | End: 2019-03-20 | Stop reason: HOSPADM

## 2019-03-20 RX ORDER — AMOXICILLIN 250 MG
1 CAPSULE ORAL
Status: DISCONTINUED | OUTPATIENT
Start: 2019-03-20 | End: 2019-03-21 | Stop reason: HOSPADM

## 2019-03-20 RX ORDER — CEFAZOLIN SODIUM 1 G/3ML
INJECTION, POWDER, FOR SOLUTION INTRAMUSCULAR; INTRAVENOUS PRN
Status: DISCONTINUED | OUTPATIENT
Start: 2019-03-20 | End: 2019-03-20 | Stop reason: SURG

## 2019-03-20 RX ORDER — OXYCODONE HCL 5 MG/5 ML
10 SOLUTION, ORAL ORAL
Status: COMPLETED | OUTPATIENT
Start: 2019-03-20 | End: 2019-03-20

## 2019-03-20 RX ORDER — DOCUSATE SODIUM 100 MG/1
100 CAPSULE, LIQUID FILLED ORAL 2 TIMES DAILY
Status: DISCONTINUED | OUTPATIENT
Start: 2019-03-20 | End: 2019-03-21 | Stop reason: HOSPADM

## 2019-03-20 RX ORDER — DEXAMETHASONE SODIUM PHOSPHATE 4 MG/ML
6 INJECTION, SOLUTION INTRA-ARTICULAR; INTRALESIONAL; INTRAMUSCULAR; INTRAVENOUS; SOFT TISSUE ONCE
Status: COMPLETED | OUTPATIENT
Start: 2019-03-21 | End: 2019-03-21

## 2019-03-20 RX ADMIN — FENTANYL CITRATE 50 MCG: 50 INJECTION, SOLUTION INTRAMUSCULAR; INTRAVENOUS at 11:06

## 2019-03-20 RX ADMIN — HYDRALAZINE HYDROCHLORIDE 5 MG: 20 INJECTION INTRAMUSCULAR; INTRAVENOUS at 12:52

## 2019-03-20 RX ADMIN — PHENYLEPHRINE HYDROCHLORIDE 100 MCG: 10 INJECTION INTRAVENOUS at 11:00

## 2019-03-20 RX ADMIN — PNEUMOCOCCAL 13-VALENT CONJUGATE VACCINE 0.5 ML: 2.2; 2.2; 2.2; 2.2; 2.2; 4.4; 2.2; 2.2; 2.2; 2.2; 2.2; 2.2; 2.2 INJECTION, SUSPENSION INTRAMUSCULAR at 17:48

## 2019-03-20 RX ADMIN — FENTANYL CITRATE 50 MCG: 50 INJECTION, SOLUTION INTRAMUSCULAR; INTRAVENOUS at 10:03

## 2019-03-20 RX ADMIN — KETOROLAC TROMETHAMINE 15 MG: 30 INJECTION, SOLUTION INTRAMUSCULAR; INTRAVENOUS at 17:43

## 2019-03-20 RX ADMIN — ONDANSETRON 4 MG: 2 INJECTION INTRAMUSCULAR; INTRAVENOUS at 11:04

## 2019-03-20 RX ADMIN — ACETAMINOPHEN 1000 MG: 500 TABLET, FILM COATED ORAL at 08:05

## 2019-03-20 RX ADMIN — FENTANYL CITRATE 100 MCG: 50 INJECTION, SOLUTION INTRAMUSCULAR; INTRAVENOUS at 10:21

## 2019-03-20 RX ADMIN — EPINEPHRINE 0.15 MG: 1 INJECTION INTRAMUSCULAR; INTRAVENOUS; SUBCUTANEOUS at 11:13

## 2019-03-20 RX ADMIN — PROPOFOL 130 MG: 10 INJECTION, EMULSION INTRAVENOUS at 10:03

## 2019-03-20 RX ADMIN — SODIUM CHLORIDE, SODIUM LACTATE, POTASSIUM CHLORIDE, CALCIUM CHLORIDE: 600; 310; 30; 20 INJECTION, SOLUTION INTRAVENOUS at 08:14

## 2019-03-20 RX ADMIN — OXYCODONE HYDROCHLORIDE 10 MG: 5 SOLUTION ORAL at 12:29

## 2019-03-20 RX ADMIN — LIDOCAINE HYDROCHLORIDE 50 MG: 20 INJECTION, SOLUTION INFILTRATION; PERINEURAL at 10:03

## 2019-03-20 RX ADMIN — TAMSULOSIN HYDROCHLORIDE 0.4 MG: 0.4 CAPSULE ORAL at 17:40

## 2019-03-20 RX ADMIN — DOCUSATE SODIUM 100 MG: 100 CAPSULE, LIQUID FILLED ORAL at 17:40

## 2019-03-20 RX ADMIN — ASPIRIN 81 MG: 81 TABLET, COATED ORAL at 22:36

## 2019-03-20 RX ADMIN — CELECOXIB 200 MG: 200 CAPSULE ORAL at 08:05

## 2019-03-20 RX ADMIN — Medication 0.5 ML: at 08:14

## 2019-03-20 RX ADMIN — ACETAMINOPHEN 650 MG: 325 TABLET, FILM COATED ORAL at 17:40

## 2019-03-20 RX ADMIN — ZOLPIDEM TARTRATE 5 MG: 5 TABLET ORAL at 22:43

## 2019-03-20 RX ADMIN — LIDOCAINE HYDROCHLORIDE 0.5 ML: 10 INJECTION, SOLUTION EPIDURAL; INFILTRATION; INTRACAUDAL; PERINEURAL at 08:14

## 2019-03-20 RX ADMIN — AMIODARONE HYDROCHLORIDE 200 MG: 200 TABLET ORAL at 17:41

## 2019-03-20 RX ADMIN — CEFAZOLIN 2 G: 330 INJECTION, POWDER, FOR SOLUTION INTRAMUSCULAR; INTRAVENOUS at 10:03

## 2019-03-20 RX ADMIN — TRANEXAMIC ACID 1000 MG: 100 INJECTION, SOLUTION INTRAVENOUS at 11:36

## 2019-03-20 RX ADMIN — BUPIVACAINE HYDROCHLORIDE 30 ML: 2.5 INJECTION, SOLUTION EPIDURAL; INFILTRATION; INTRACAUDAL; PERINEURAL at 11:13

## 2019-03-20 RX ADMIN — TRANEXAMIC ACID 1 G: 100 INJECTION, SOLUTION INTRAVENOUS at 10:05

## 2019-03-20 RX ADMIN — PHENYLEPHRINE HYDROCHLORIDE 100 MCG: 10 INJECTION INTRAVENOUS at 10:42

## 2019-03-20 RX ADMIN — FENTANYL CITRATE 50 MCG: 50 INJECTION, SOLUTION INTRAMUSCULAR; INTRAVENOUS at 10:15

## 2019-03-20 RX ADMIN — DEXAMETHASONE SODIUM PHOSPHATE 8 MG: 4 INJECTION, SOLUTION INTRAMUSCULAR; INTRAVENOUS at 10:10

## 2019-03-20 RX ADMIN — ACETAMINOPHEN 650 MG: 325 TABLET, FILM COATED ORAL at 22:36

## 2019-03-20 RX ADMIN — SODIUM CHLORIDE, SODIUM LACTATE, POTASSIUM CHLORIDE, CALCIUM CHLORIDE: 600; 310; 30; 20 INJECTION, SOLUTION INTRAVENOUS at 09:42

## 2019-03-20 RX ADMIN — CEFAZOLIN SODIUM 2 G: 2 INJECTION, SOLUTION INTRAVENOUS at 17:44

## 2019-03-20 ASSESSMENT — COGNITIVE AND FUNCTIONAL STATUS - GENERAL
STANDING UP FROM CHAIR USING ARMS: A LITTLE
TURNING FROM BACK TO SIDE WHILE IN FLAT BAD: A LITTLE
MOBILITY SCORE: 18
MOVING FROM LYING ON BACK TO SITTING ON SIDE OF FLAT BED: A LITTLE
DAILY ACTIVITIY SCORE: 23
SUGGESTED CMS G CODE MODIFIER DAILY ACTIVITY: CI
WALKING IN HOSPITAL ROOM: A LITTLE
MOVING TO AND FROM BED TO CHAIR: A LITTLE
DRESSING REGULAR LOWER BODY CLOTHING: A LITTLE
SUGGESTED CMS G CODE MODIFIER MOBILITY: CK
CLIMB 3 TO 5 STEPS WITH RAILING: A LITTLE

## 2019-03-20 ASSESSMENT — LIFESTYLE VARIABLES
HAVE PEOPLE ANNOYED YOU BY CRITICIZING YOUR DRINKING: NO
EVER_SMOKED: YES
TOTAL SCORE: 0
TOTAL SCORE: 0
HOW MANY TIMES IN THE PAST YEAR HAVE YOU HAD 5 OR MORE DRINKS IN A DAY: 0
EVER HAD A DRINK FIRST THING IN THE MORNING TO STEADY YOUR NERVES TO GET RID OF A HANGOVER: NO
AVERAGE NUMBER OF DAYS PER WEEK YOU HAVE A DRINK CONTAINING ALCOHOL: 7
HAVE YOU EVER FELT YOU SHOULD CUT DOWN ON YOUR DRINKING: NO
ON A TYPICAL DAY WHEN YOU DRINK ALCOHOL HOW MANY DRINKS DO YOU HAVE: 1
TOTAL SCORE: 0
EVER_SMOKED: YES
ALCOHOL_USE: YES
EVER FELT BAD OR GUILTY ABOUT YOUR DRINKING: NO
CONSUMPTION TOTAL: NEGATIVE

## 2019-03-20 ASSESSMENT — COPD QUESTIONNAIRES
DO YOU EVER COUGH UP ANY MUCUS OR PHLEGM?: NO/ONLY WITH OCCASIONAL COLDS OR INFECTIONS
HAVE YOU SMOKED AT LEAST 100 CIGARETTES IN YOUR ENTIRE LIFE: YES
COPD SCREENING SCORE: 4
DURING THE PAST 4 WEEKS HOW MUCH DID YOU FEEL SHORT OF BREATH: NONE/LITTLE OF THE TIME

## 2019-03-20 ASSESSMENT — PATIENT HEALTH QUESTIONNAIRE - PHQ9
2. FEELING DOWN, DEPRESSED, IRRITABLE, OR HOPELESS: NOT AT ALL
1. LITTLE INTEREST OR PLEASURE IN DOING THINGS: NOT AT ALL
SUM OF ALL RESPONSES TO PHQ9 QUESTIONS 1 AND 2: 0

## 2019-03-20 ASSESSMENT — PAIN SCALES - GENERAL: PAIN_LEVEL: 0

## 2019-03-20 NOTE — ANESTHESIA PROCEDURE NOTES
Peripheral Block  Performed by: AGNES CHAPA  Authorized by: AGNES CHAPA     Patient Location:  OR  Start Time:  3/20/2019 11:10 AM  End Time:  3/20/2019 11:13 AM  Reason for Block: at surgeon's request    timeout performed    Patient Position:  Supine  Prep: ChloraPrep    Monitoring:  Heart rate, cardiac monitor and continuous pulse ox  Block Region:  Lower Extremity  Lower Extremity - Block Type:  Adductor canal block  Laterality:  Left  Procedures: ultrasound guided  Image captured, interpreted and electronically stored.    Block Type:  Single-shot  Needle Length:  100mm  Needle Gauge:  21 G  Needle Localization:  Ultrasound guidance  Injection Assessment:  Negative aspiration for heme, incremental injection and local visualized surrounding nerve on ultrasound  Evidence of intravascular injection: No     US Guided Selective Femoral Nerve Block at Adductor Canal:   US probe placed at mid-thigh level on externally rotated leg and femur identified.  Probe directed medially until Sartorius Muscle (SM), Femoral Artery (FA) and Saphenous Nerve (SN) identified in Adductor Canal (AC).  Needle inserted anterolateral to probe in an in plane approach into a subsartorial perivascular perineural position.  After negative aspiration LA injected with ease and visualized spreading within the AC.

## 2019-03-20 NOTE — ADDENDUM NOTE
Addendum  created 03/20/19 1639 by Natan Warren M.D.    Anesthesia Intra Blocks edited, Sign clinical note

## 2019-03-20 NOTE — ADDENDUM NOTE
Addendum  created 03/20/19 1214 by Natan Warren M.D.    Anesthesia Event edited, Anesthesia Intra Meds edited

## 2019-03-20 NOTE — PROGRESS NOTES
Pt arrived to floor after report from Rupinder in PACU.     Pt A/O x 4, HR RRR, LS clear BUL diminished BLL. CMS+, JACOB. LLE pink, warm, brisk capillary refill, pedal pulse 2+. L knee dressing CDI. Full assessment complete as charted in flowsheets.    Oriented pt to floor and unit routine, including use of call light and need for staff assistance with mobility. Pt able to void 125mL.  and SCDs on. Call light within reach, rounding in place.

## 2019-03-20 NOTE — ANESTHESIA TIME REPORT
Anesthesia Start and Stop Event Times     Date Time Event    3/20/2019 0942 Anesthesia Start     1123 Anesthesia Stop        Responsible Staff  03/20/19    Name Role Begin End    Natan Warren M.D. Anesth 0942 1123        Post op Diagnosis  Osteoarthritis of left knee    Premium Reason  Non-Premium      Exception Times    Start Time             End Time                     #                         Name                                                Comments:

## 2019-03-20 NOTE — OP REPORT
DATE OF SERVICE:  03/20/2019    PREOPERATIVE DIAGNOSIS:  Degenerative arthritis, left knee.    POSTOPERATIVE DIAGNOSIS:  Degenerative arthritis, left knee.    PROCEDURE PERFORMED:  Left total knee arthroplasty.    SURGEON:  Camden Mary MD    ANESTHESIA:  Attempted spinal, followed by general.    ANESTHESIOLOGIST:  Dr. Warren.    ASSISTANT:  Paolo Kim PA-C    ESTIMATED BLOOD LOSS:  50 mL    COMPONENTS PLACED:  Cemented [employer] size 8 cobalt chrome   posterior stabilized femur, size 7 tibia with an 11 mm post-stabilized insert   and 38 mm patellar button.    FINDINGS:  Severe varus deformity, medial disease, with tibial bone loss.    COMPLICATIONS:  None.    INDICATIONS FOR SURGERY:  The patient is an 87-year-old gentleman who has   progressively worsening pain, giving way and deformity secondary to   degenerative arthritis of his left knee.  He has tried to manage this with   assisted devices, physical therapy, bracing, antiinflammatories, but he is   just not getting any relief and he has had such severe disease, he is   uncomfortable and a fall risk.  Exam shows a severe varus deformity and   collapse on weightbearing.  His x-rays, standing views, show severe   bone-on-bone change with bone loss on the posteromedial tibia, significant   varus deformity.  He is therefore a candidate for left total knee replacement.    SUMMARY:  The patient was brought to the operating room and anesthesia was   administered.  Antibiotics and tranexamic acid were given IV.  Left leg   prepped and draped in the usual sterile manner.  Leg was exsanguinated,   tourniquet inflated, time-out was called.    I made an anterior incision centered over the medial aspect of the patella.    Dissection was carried sharply through skin and subcutaneous tissues.    Bleeders stopped with cautery.  Standard medial parapatellar arthrotomy was   made.  Medial release was done distally.  There was a large effusion.  Patella    was everted, severely worn.  It was measured to 25 mm, we took it down to 15   mm.  A 38 button had good coverage.  We prepared it and resected the excess   bone.  The knee was flexed up.  Tourniquet released.  We excised cruciates and   the menisci.  We used a 5-degree jig in the distal femur and took the   standard amount off.  It measured to a size 8.  We set rotation appropriately   and made all the cuts for size 8 posterior stabilized implant.  Peripheral   osteophytes were removed.    Tibia was then exposed.  The extramedullary jig utilized, aligned off the   medial third tubercle, centered the ankle with neutral slope and we used the   revision guide and just set it on low worn part of the tibia to make sure we   release the menisci that most worn part.  We took care to protect the medial   collateral.  Posterior osteophytes were removed from the femur.  Capsular   release was done.  Cruciate remnants and posterior menisci excised.  We   carried the medial release around the posteromedial tibia and removed all   medial osteophytes and overgrowth.  The size 7 tibia had the best coverage.    We pinned it and trialed it.  We had nice good extension and good balance and   good patellar tracking without having to do any further releases, so we were   comfortable, made all appropriate punches, cemented the components into place.    Cement debris was removed.  Wound was soaked with dilute Betadine for a few   minutes and then flushed with saline after trialing with the final 11 mm   insert.  We did inject the posterior capsule with a solution of analgesic and   anesthetic.  The knee was placed in flexion and closed the tendon with a   running #2 Quill.  The skin was closed with layers of Vicryl and running 3-0   Monocryl.  The remainder of the local was injected intraarticularly.  Silver   impregnated dressing was placed followed by a compression wrap and PolarCare   unit.  Patient was stable during the procedure  and went to recovery room in   good condition.       ____________________________________     MD ASHLEY AVILA / ANTONIO    DD:  03/20/2019 11:47:31  DT:  03/20/2019 13:14:47    D#:  [id number]  Job#:  [id number]

## 2019-03-20 NOTE — OR SURGEON
Immediate Post OP Note    PreOp Diagnosis: DJD L knee    PostOp Diagnosis: same    Procedure(s):  Left KNEE ARTHROPLASTY TOTAL - Wound Class: Clean    Surgeon(s):  Camden Mary M.D.    Anesthesiologist/Type of Anesthesia:  Anesthesiologist: Natan Warren M.D./General    Surgical Staff:  Assistant: ALICIA Chavez  Circulator: Anamika Shah R.N.  Relief Circulator: Hoa Damico R.N.  Relief Scrub: Michael Baker  Scrub Person: Daniela Harding    Specimens removed if any:  * No specimens in log *    Estimated Blood Loss: 50    Findings: severe medial and PF disease    Complications: none        3/20/2019 11:42 AM Camden Mary M.D.

## 2019-03-20 NOTE — ANESTHESIA PREPROCEDURE EVALUATION
Relevant Problems   (+) GI (gastrointestinal bleed) recent (Resolved)   (+) Hypertension   (+) Paroxysmal atrial fibrillation (HCC) (Resolved)       Physical Exam    Airway   Mallampati: II  TM distance: >3 FB  Neck ROM: full       Cardiovascular - normal exam  Rhythm: regular  Rate: normal  (-) murmur     Dental - normal exam  Comments: Upper partial out, missing several, crowns, no loose       Pulmonary - normal exam  Breath sounds clear to auscultation     Abdominal    Neurological - normal exam               Anesthesia Plan    ASA 2       Plan - peripheral nerve block and spinal     Peripheral nerve block will be post-op pain control  (GA with LMA backup)      Induction: intravenous    Postoperative Plan: Postoperative administration of opioids is intended.        Informed Consent:    Anesthetic plan and risks discussed with patient.

## 2019-03-20 NOTE — ANESTHESIA PROCEDURE NOTES
Spinal Block  Performed by: AGNES CHAPA  Authorized by: AGNES CHAPA     Start Time:  3/20/2019 9:50 AM  End Time:  3/20/2019 10:00 AM  Reason for Block: primary anesthetic    patient identified, IV checked, site marked, risks and benefits discussed, surgical consent, monitors and equipment checked, pre-op evaluation and timeout performed    Patient Position:  Sitting  Prep: ChloraPrep, patient draped and sterile technique    Monitoring:  Blood pressure, continuous pulse oximetry and heart rate  Approach:  Midline  Location:  L3-4  Injection Technique:  Single-shot  Needle Type:  Pencan  Needle Gauge:  25 G  CSF flowing pre/post injection:  No  Sensory Level:  T10 (na)   Attempted spinal at L3-4 and L2-3, just bone.  At L4-5 I was able to get between spinous process and seemed good but never felt a pop, never got CSF.  Decision made to go to GA.

## 2019-03-20 NOTE — OR NURSING
Pre op admission completed.  Pt and wife educated on surgical plan of care, all questions answered.  Bed in low position and call light within reach.  Hourly rounding in place.

## 2019-03-20 NOTE — ANESTHESIA POSTPROCEDURE EVALUATION
Patient: Rc Auguste    Procedure Summary     Date:  03/20/19 Room / Location:  [place] [id number] / SURGERY [facility]    Anesthesia Start:  0942 Anesthesia Stop:  1123    Procedure:  KNEE ARTHROPLASTY TOTAL (Left Knee) Diagnosis:  (UNILATERAL PRIMARY OSTEOARTHRITIS LEFT KNEE)    Surgeon:  Camden Mary M.D. Responsible Provider:  Natan Warren M.D.    Anesthesia Type:  peripheral nerve block, spinal ASA Status:  2          Final Anesthesia Type: peripheral nerve block, spinal  Last vitals  BP   Blood Pressure : 134/89, NIBP: 143/82    Temp   36.8 °C (98.2 °F)    Pulse   Pulse: 100, Heart Rate (Monitored): (!) 101   Resp   18    SpO2   96 %      Anesthesia Post Evaluation    Patient location during evaluation: PACU  Patient participation: complete - patient participated  Level of consciousness: awake and alert  Pain score: 0    Airway patency: patent  Anesthetic complications: no  Cardiovascular status: hemodynamically stable  Respiratory status: acceptable  Hydration status: euvolemic    PONV: none           Nurse Pain Score: 0 (NPRS)

## 2019-03-20 NOTE — OR NURSING
Patient awake and alert. Pt has tolerated sips of water and juice as well as a box lunch without issue. No nausea reported. Pain has decreased with pain medications and pain is tolerable at this time. Oxymask in place at 10L for ERAS protocol that can be removed at 1930. Pt has been updated on plan of care and all questions have been answered at this time. Pt wife has been updated on pt status and room assignment. Awaiting room clean at this time. VSS. Dressing to left knee is CDI. Polar ice sleeve in place. Will continue to monitor.

## 2019-03-20 NOTE — ANESTHESIA QCDR
2019 Qualified Clinical Data Registry (for Quality Improvement)     Postoperative nausea/vomiting risk protocol (Adult = 18 yrs and Pediatric 3-17 yrs)- (430 and 463)  General inhalation anesthetic (NOT TIVA) with PONV risk factors: No  Provision of anti-emetic therapy with at least 2 different classes of agents: N/A  Patient DID NOT receive anti-emetic therapy and reason is documented in Medical Record: N/A    Multimodal Pain Management- (AQI59)  Patient undergoing Elective Surgery (i.e. Outpatient, or ASC, or Prescheduled Surgery prior to Hospital Admission): Yes  Use of Multimodal Pain Management, two or more drugs and/or interventions, NOT including systemic opioids: Yes   Exception: Documented allergy to multiple classes of analgesics:  N/A    PACU assessment of acute postoperative pain prior to Anesthesia Care End- Applies to Patients Age = 18- (ABG7)  Initial PACU pain score is which of the following: < 7/10  Patient unable to report pain score: N/A    Post-anesthetic transfer of care checklist/protocol to PACU/ICU- (426 and 427)  Upon conclusion of case, patient transferred to which of the following locations: PACU/Non-ICU  Use of transfer checklist/protocol:   Exclusion: Service Performed in Patient Hospital Room (and thus did not require transfer):     PACU Reintubation- (AQI31)  General anesthesia requiring endotracheal intubation (ETT) along with subsequent extubation in OR or PACU: No  Required reintubation in the PACU: N/A  Extubation was a planned trial documented in the medical record prior to removal of the original airway device: N/A    Unplanned admission to ICU related to anesthesia service up through end of PACU care- (MD51)  Unplanned admission to ICU (not initially anticipated at anesthesia start time): No

## 2019-03-20 NOTE — CARE PLAN
Problem: Safety  Goal: Will remain free from falls  Outcome: PROGRESSING AS EXPECTED  Low risk for falls. Non skid socks, fall wrist band, appropriate door signs. Path free of clutter. Educated pt to call for assistance with mobility; pt states understanding and agreement. Call light within reach, rounding in place.    Problem: Venous Thromboembolism (VTW)/Deep Vein Thrombosis (DVT) Prevention:  Goal: Patient will participate in Venous Thrombosis (VTE)/Deep Vein Thrombosis (DVT)Prevention Measures  Outcome: PROGRESSING AS EXPECTED  SCDS, ASA, ambulation    Problem: Fluid Volume:  Goal: Will maintain balanced intake and output  Outcome: PROGRESSING AS EXPECTED  Pt tolerating PO fluids, has voided already

## 2019-03-20 NOTE — PROGRESS NOTES
· 2 RN skin check complete with JUJU Amador.  · Devices in place oxymask, , SCDs.  · Skin assessed under devices intact.  · Sacrum red but blanching. Surgical incision to L knee, dressing CDI. No other s/sx breakdown.  · The following interventions in place: Pillows for support/positioning, no tubing under pt, pt repositions self

## 2019-03-21 VITALS
WEIGHT: 186.73 LBS | TEMPERATURE: 97 F | BODY MASS INDEX: 25.29 KG/M2 | HEART RATE: 67 BPM | HEIGHT: 72 IN | RESPIRATION RATE: 16 BRPM | DIASTOLIC BLOOD PRESSURE: 68 MMHG | OXYGEN SATURATION: 97 % | SYSTOLIC BLOOD PRESSURE: 111 MMHG

## 2019-03-21 PROBLEM — Z96.652 STATUS POST LEFT KNEE REPLACEMENT: Chronic | Status: ACTIVE | Noted: 2019-03-21

## 2019-03-21 LAB
HCT VFR BLD AUTO: 33.3 % (ref 42–52)
HGB BLD-MCNC: 11.2 G/DL (ref 14–18)

## 2019-03-21 PROCEDURE — 700112 HCHG RX REV CODE 229: Performed by: PHYSICIAN ASSISTANT

## 2019-03-21 PROCEDURE — 97161 PT EVAL LOW COMPLEX 20 MIN: CPT

## 2019-03-21 PROCEDURE — 700111 HCHG RX REV CODE 636 W/ 250 OVERRIDE (IP): Performed by: PHYSICIAN ASSISTANT

## 2019-03-21 PROCEDURE — G0378 HOSPITAL OBSERVATION PER HR: HCPCS

## 2019-03-21 PROCEDURE — 96376 TX/PRO/DX INJ SAME DRUG ADON: CPT

## 2019-03-21 PROCEDURE — 700102 HCHG RX REV CODE 250 W/ 637 OVERRIDE(OP): Performed by: PHYSICIAN ASSISTANT

## 2019-03-21 PROCEDURE — A9270 NON-COVERED ITEM OR SERVICE: HCPCS | Performed by: PHYSICIAN ASSISTANT

## 2019-03-21 PROCEDURE — 97165 OT EVAL LOW COMPLEX 30 MIN: CPT

## 2019-03-21 PROCEDURE — 85014 HEMATOCRIT: CPT

## 2019-03-21 PROCEDURE — 85018 HEMOGLOBIN: CPT

## 2019-03-21 PROCEDURE — 36415 COLL VENOUS BLD VENIPUNCTURE: CPT

## 2019-03-21 RX ORDER — HYDROCODONE BITARTRATE AND ACETAMINOPHEN 10; 325 MG/1; MG/1
.5-1 TABLET ORAL EVERY 4 HOURS PRN
Qty: 30 TAB | Refills: 0 | Status: SHIPPED | OUTPATIENT
Start: 2019-03-21 | End: 2019-03-28

## 2019-03-21 RX ADMIN — DEXAMETHASONE SODIUM PHOSPHATE 6 MG: 4 INJECTION, SOLUTION INTRAMUSCULAR; INTRAVENOUS at 05:52

## 2019-03-21 RX ADMIN — ACETAMINOPHEN 650 MG: 325 TABLET, FILM COATED ORAL at 05:53

## 2019-03-21 RX ADMIN — CEFAZOLIN SODIUM 2 G: 2 INJECTION, SOLUTION INTRAVENOUS at 03:10

## 2019-03-21 RX ADMIN — TAMSULOSIN HYDROCHLORIDE 0.4 MG: 0.4 CAPSULE ORAL at 05:53

## 2019-03-21 RX ADMIN — LEVOTHYROXINE SODIUM 50 MCG: 25 TABLET ORAL at 05:52

## 2019-03-21 RX ADMIN — DOCUSATE SODIUM 100 MG: 100 CAPSULE, LIQUID FILLED ORAL at 05:53

## 2019-03-21 RX ADMIN — ACETAMINOPHEN 650 MG: 325 TABLET, FILM COATED ORAL at 11:42

## 2019-03-21 RX ADMIN — ASPIRIN 81 MG: 81 TABLET, COATED ORAL at 05:53

## 2019-03-21 RX ADMIN — KETOROLAC TROMETHAMINE 15 MG: 30 INJECTION, SOLUTION INTRAMUSCULAR; INTRAVENOUS at 05:53

## 2019-03-21 ASSESSMENT — ACTIVITIES OF DAILY LIVING (ADL): TOILETING: INDEPENDENT

## 2019-03-21 ASSESSMENT — COGNITIVE AND FUNCTIONAL STATUS - GENERAL
TURNING FROM BACK TO SIDE WHILE IN FLAT BAD: A LITTLE
SUGGESTED CMS G CODE MODIFIER DAILY ACTIVITY: CJ
MOBILITY SCORE: 18
HELP NEEDED FOR BATHING: A LITTLE
STANDING UP FROM CHAIR USING ARMS: A LITTLE
MOVING TO AND FROM BED TO CHAIR: A LITTLE
DRESSING REGULAR LOWER BODY CLOTHING: A LITTLE
CLIMB 3 TO 5 STEPS WITH RAILING: A LITTLE
MOVING FROM LYING ON BACK TO SITTING ON SIDE OF FLAT BED: A LITTLE
SUGGESTED CMS G CODE MODIFIER MOBILITY: CK
WALKING IN HOSPITAL ROOM: A LITTLE
DAILY ACTIVITIY SCORE: 22

## 2019-03-21 ASSESSMENT — GAIT ASSESSMENTS
DEVIATION: ANTALGIC;DECREASED HEEL STRIKE;DECREASED TOE OFF
ASSISTIVE DEVICE: FRONT WHEEL WALKER
GAIT LEVEL OF ASSIST: SUPERVISED
DISTANCE (FEET): 175

## 2019-03-21 NOTE — CARE PLAN
Problem: Safety  Goal: Free from accidental injury  Outcome: MET Date Met: 03/21/19      Problem: Discharge Barriers/Planning  Goal: Patient's Continuum of care needs are met  Outcome: MET Date Met: 03/21/19      Problem: Risk for Impaired Mobility  Goal: Mobilization  Outcome: MET Date Met: 03/21/19

## 2019-03-21 NOTE — CARE PLAN
Problem: Communication  Goal: The ability to communicate needs accurately and effectively will improve  Outcome: PROGRESSING AS EXPECTED  Understands discharge plan.     Problem: Safety  Goal: Will remain free from injury  Outcome: PROGRESSING AS EXPECTED  Patient calls for help appropriately.

## 2019-03-21 NOTE — CARE PLAN
Problem: Safety  Goal: Will remain free from falls    Intervention: Implement fall precautions  Calls appropriately. Call light and personal belongings within easy reach. Educated on level of risk. Fall precautions in place. Instructed to call for assistance whenever needed.      Problem: Pain Management  Goal: Pain level will decrease to patient's comfort goal    Intervention: Follow pain managment plan developed in collaboration with patient and Interdisciplinary Team  With complaints of pain and medicated per MAR. Clustered nursing interventions to promote rest. Hourly rounding in place. Instructed to report if pain worsens or is unrelieved by pain medications.

## 2019-03-21 NOTE — DISCHARGE SUMMARY
Rc Auguste was admitted on 3/20/2019 for UNILATERAL PRIMARY OSTEOARTHRITIS LEFT KNEE   Degenerative arthritis of left knee  Degenerative arthritis of left knee  Patient was diagnosed with severe degenerative arthritis in the left knee and underwent a left total knee arthroplasty by Dr. Camden Mary on the date of admission.    Hospital course:     The patient has done well, with no complications.  Patient denies chest pain, calf pain or shortness of breath.   Pain is well-controlled at present.  Patient is ambulating well with the use of an assistive device, and progressing in physical therapy.   Patient is neurologically and vascularly intact with palpable pedal pulses bilaterally.      Discharge date: 3/21/19    Patient is being discharged to home.     Allergies:  Nkda [no known drug allergy]       Medication List      START taking these medications      Instructions   HYDROcodone/acetaminophen  MG Tabs  Commonly known as:  NORCO   Take 0.5-1 Tabs by mouth every four hours as needed for Severe Pain (pain) for up to 7 days.  Dose:  0.5-1 Tab        CONTINUE taking these medications      Instructions   amiodarone 200 MG Tabs  Commonly known as:  CORDARONE   Take 200 mg by mouth every evening.  Dose:  200 mg     CALCIUM PO   Take 1 Tab by mouth every day.  Dose:  1 Tab     docosahexanoic acid 1000 MG Caps  Commonly known as:  OMEGA 3 FA   Take 1,000 mg by mouth every day.  Dose:  1000 mg     levothyroxine 50 MCG Tabs  Commonly known as:  SYNTHROID   Take 50 mcg by mouth Every morning on an empty stomach.  Dose:  50 mcg     MULTI-VITAMIN PO   Take 1 Tab by mouth every day.  Dose:  1 Tab     mupirocin 2 % Oint  Commonly known as:  BACTROBAN   Apply 1 Dose to affected area(s) 2 times a day.  Dose:  1 Dose     VITAMIN C PO   Take 1 Tab by mouth every day.  Dose:  1 Tab            Discharge Instructions:     Patient is instructed to ambulate and weight bear as tolerated with the use of an assistive device,  and to continue physical therapy exercises given during this hospital stay.   Patient is to ice and elevate the surgical leg regularly, with pillows under the ankle, nothing is to be placed under the knee.   Patient was given detailed wound care instructions, and will leave the silver dressing on until first post-op visit.   Aspirin twice daily for DVT prophylaxis.  Patient is to follow up with Dr. Mary's office in 1-2 weeks.

## 2019-03-21 NOTE — PROGRESS NOTES
S: Feels great, some delirium yesterday but clear today, minimal pain    O: Great knee ROM, dressing intact.  ALert and oriented    Blood pressure 102/69, pulse 63, temperature 36.4 °C (97.5 °F), temperature source Temporal, resp. rate 17, height 1.829 m (6'), weight 84.7 kg (186 lb 11.7 oz), SpO2 99 %.    Recent Labs      03/21/19   0421   HEMOGLOBIN  11.2*   HEMATOCRIT  33.3*         Intake/Output Summary (Last 24 hours) at 03/21/19 0640  Last data filed at 03/20/19 1617   Gross per 24 hour   Intake             1300 ml   Output              300 ml   Net             1000 ml         A:  Patient Active Problem List    Diagnosis Date Noted   • Hypertension 07/22/2015       Doing well after TKA    PLAN: lIkely home later today

## 2019-03-21 NOTE — DISCHARGE INSTRUCTIONS
*Follow up with Dr. Mary at scheduled appointment  *Weight bearing as tolerated.                      *Activity as tolerated  *Use assistive device for all activity  *Continue exercises provided by physical therapy and range of motion  *Elevate leg as needed; Ok to have pillow under the ankle NO pillow under knee  *Ice as needed (20 minutes every 1-2 hours)  *Keep silver dressing in place until follow up with doctor, ok to remove ace wrap  *Ok to shower with waterproof dressing in place  *No soaking of the incision; no baths, hot tubs, or swimming until cleared by doctor  * Aspirin 81mg twice a day a day for blood clot prevention           *Take medications as prescribed by doctor  *Call doctor’s office with any questions or concerns       Discharge Instructions    Discharged to home by car with relative. Discharged via wheelchair, hospital escort: Yes.  Special equipment needed: Walker    Be sure to schedule a follow-up appointment with your primary care doctor or any specialists as instructed.     Discharge Plan:   Diet Plan: Discussed  Activity Level: Discussed  Confirmed Follow up Appointment: Patient to Call and Schedule Appointment  Confirmed Symptoms Management: Discussed  Medication Reconciliation Updated: Yes  Pneumococcal Vaccine Administered/Refused: Given (See MAR)  Influenza Vaccine Indication: Not indicated: Previously immunized this influenza season and > 8 years of age    I understand that a diet low in cholesterol, fat, and sodium is recommended for good health. Unless I have been given specific instructions below for another diet, I accept this instruction as my diet prescription.   Other diet: Regular    Special Instructions: Discharge instructions for the Orthopedic Patient    Follow up with Primary Care Physician within 2 weeks of discharge to home, regarding:  Review of medications and diagnostic testing.  Surveillance for medical complications.  Workup and treatment of osteoporosis, if  appropriate.     -Is this a Joint Replacement patient? Yes Total Joint Knee Replacement Discharge Instructions    Pain  - The goal is to slowly wean off the prescription pain medicine.  - Ice can be used for pain control.  20 minutes at a time is recommended, and never directly against your skin or incision.  - Most patients are off the pain pills by 3 weeks; others may require a low level of pain medications for many months.  If your pain continues to be severe, follow up with your physician.  Infection    Knee joint infections; occur in fewer than 2% of patients. The most common causes of infection following total knee replacement surgery are from bacteria that enter the bloodstream during dental procedures, urinary tract infections, or skin infections. These bacteria can lodge around your knee replacement and cause an infection.  - Keep the incision as clean and dry as possible.  - Always wash your hands before touching your incision.  - Skin infections tend to develop around 7-10 days after surgery; most can be treated with oral antibiotics.  - Dental Care should be delayed for 3 months after surgery, your surgeon recommends taking a dose of antibiotics 1 hour prior to any dental procedure. After 2 years, most surgeons recommend antibiotics only before an extensive procedure.  Ask your surgeon what he recommends.  - Signs and symptoms of infection can include:  low grade fever, redness, pain, swelling and drainage from your incision.  Notify your surgeon immediately if you develop any of these symptoms.  Other instructions  - Bowel habits - constipation is extremely common and is caused by a combination of anesthesia, lack of mobility and pain medicine.  Use stool softeners or laxatives if necessary. It is important not to ignore this problem, as bowel obstructions can be a serious complication after joint replacement surgery.  - Mood/Energy Level - Many patients experience a lack of energy and endurance for up  to 2-3 months after surgery.  Some may also feel down and can even become depressed.  This is likely due to the postoperative anemia, change in activity level, lack of sleep, pain medicine and just the emotional reaction to the surgery itself that is a big disruption in a person’s life.  This usually passes.  If symptoms persist, follow up with your primary physician.  - Returning to work - Your surgeon will give you more specific instructions. Depending on the type of activities you perform, it may be 6 to 8 weeks before you return to work.   Generally, if you work a sedentary job requiring little standing or walking, most patients may return within 2-6 weeks.  Manual labor jobs involving walking, lifting and standing may take longer. Your surgeon’s office can provide a release to part-time or light duty work early on in your recovery and progress you to full duty as able.    - Driving - If your left knee was replaced and you have an automatic transmission, you may be able to begin driving in a week or so, provided you are no longer taking narcotic pain medication. If your right knee was replaced, avoid driving for 6 to 8 weeks. Remember that your reflexes may not be as sharp as before your surgery. Ask your surgeon for specific instructions.   - Avoiding falls - A fall during the first few weeks after surgery can damage your new knee and may result in a need for further surgery.   throw rugs and tack down loose carpeting.  Be aware of floor hazards such as pets, small objects or uneven surfaces.    - [employer] Metal Detectors - The sensitivity of metal detectors varies and it is likely that your prosthesis will cause an alarm.  Inform the  of your artificial joint.  Diet  - Resume your normal diet as tolerated.  - It is important to achieve a healthy nutritional status by eating a well balanced diet on a regular basis.  - Your physician may recommend that you take iron and vitamin supplements.    - Continue to drink plenty of fluids.  Shower/Bathing  - You may shower as soon as you get home from the hospital unless otherwise instructed.  - Keep your incision out of water.  To keep the incision dry when showering, cover it with a plastic bag or plastic wrap.  - Pat incision dry if it gets wet.  Don’t rub.  - Do not submerge in a bath until staples are out and the incision is completely healed. (Approximately 6-8 weeks)  Dressing Change:  Procedure (if recommended by your physician)  - Wash hands.  - Open all new dressing change materials.  - Remove old dressing and discard.  - Inspect incision for redness, increase in clear drainage, yellow/green drainage, odor and surrounding skin hot to touch.  -  ABD (large gauze) pad or “island dressing” by one corner and lay over the incision.  Be careful not to touch the inside of the dressing that will lay over the incision.  - Secure in place as instructed (Ace wrap or tape).    Swelling/Bruising    - Swelling can last from 3-6 months.  - Elevate your leg higher than your heart while reclining.   The first week you are home you should elevate your leg an equal amount of time, as you are active.    - Anti-inflammatory pills can be taken once you have stopped the blood thinners.  - The swelling is usually worse after you go home since you are upright for longer periods of time.  - Bruising is common and can involve the entire leg including the thigh, calf and even foot. Bruising often does not appear until after you arrive home and it can be quite dramatic- purple, black, and green.  The bruising you can see is not usually concerning and will subside without any treatment.      Blood Clot Prevention  Blood clots in the legs and the less common, but frightening, clots that travel to the lungs are a real focus of our preventative. Most patients are at standard risk for them, but those patients who are at higher risk include people who have had previous clots, a  family history of clotting, smoking, diabetes, obesity, advanced age, use of estrogen and a sedentary lifestyle.    - Signs of blood clots in legs - Swelling in thigh, calf or ankle that does not go down with elevation.  Pain, heat and tenderness in calf, back of calf or groin area.  NOTE: blood clots can occur in either leg.  - You have been receiving anticoagulant therapy (blood thinners) in the hospital and you may be instructed to continue at home depending on your risk factors.  - Your risk for developing a clot continues for up to 2-3 months after surgery.  You should avoid prolonged sitting and dehydration during that time (long air trips and car trips).  If you do take a trip during this time, please get up and move around every 1- 1.5 hours.  - If you are prescribed blood-thinning medication for home, follow instructions as directed. (Handouts provided if applicable).      Activity  Once home, you should continue to stay active. The key is to remember not to overdo it! While you can expect some good days and some bad days, you should notice a gradual improvement and a gradual increase in your endurance over the next 6 to 12 months. Exercise is a critical component of home care, particularly during the first few weeks after surgery.     - Normal activities of daily living You should be able to resume most within 3 to 6 weeks following surgery. Some pain with activity and at night is common for several weeks after surgery  -  Physical Therapy Exercises - Continue to do the exercises prescribed for at least two months after surgery. Riding a stationary bicycle can help maintain muscle tone and keep your knee flexible. Try to achieve the maximum degree of bending and extension possible. (handout provided by Therapist).  - Sexual Activity -. Your surgeon can tell you when it safe to resume sexual activity.    - Sleeping Positions - You can safely sleep on your back, on either side, or on your stomach.   - Other  Activities - Walk as much as you like, but remember that walking is no substitute for the exercises your doctor and physical therapist will prescribe. Lower impact activities are preferred.  If you have specific questions, consult your Surgeon.    When to Call the Doctor   Call the physician if:   - Fever over 100.5? F  - Increased pain, drainage, redness, odor or heat around the incision area  - Shaking chills  - Increased knee pain with activity and rest  - Increased pain in calf, tenderness or redness above or below the knee  - Increased swelling of calf, ankle, foot  - Sudden increased shortness of breath, sudden onset of chest pain, localized chest pain with coughing  - Incision opening  Or, if there are any questions or concerns about medications or care.       -Is this patient being discharged with medication to prevent blood clots?  Yes, Aspirin Aspirin, ASA oral tablets  What is this medicine?  ASPIRIN (AS pir in) is a pain reliever. It is used to treat mild pain and fever. This medicine is also used as directed by a doctor to prevent and to treat heart attacks, to prevent strokes, and to treat arthritis or inflammation.  This medicine may be used for other purposes; ask your health care provider or pharmacist if you have questions.  COMMON BRAND NAME(S): Aspir-Low, Aspir-Chelsea, Aspirtab, Antony Advanced Aspirin, Antony Aspirin, Antony Aspirin Extra Strength, Antony Aspirin Plus, Antony Extra Strength, Antony Extra Strength Plus, Antony Genuine Aspirin, Antony Womens Aspirin, Bufferin, Bufferin Extra Strength, Bufferin Low Dose  What should I tell my health care provider before I take this medicine?  They need to know if you have any of these conditions:  -anemia  -asthma  -bleeding problems  -child with chickenpox, the flu, or other viral infection  -diabetes  -gout  -if you frequently drink alcohol containing drinks  -kidney disease  -liver disease  -low level of vitamin K  -lupus  -smoke tobacco  -stomach ulcers  or other problems  -an unusual or allergic reaction to aspirin, tartrazine dye, other medicines, dyes, or preservatives  -pregnant or trying to get pregnant  -breast-feeding  How should I use this medicine?  Take this medicine by mouth with a glass of water. Follow the directions on the package or prescription label. You can take this medicine with or without food. If it upsets your stomach, take it with food. Do not take your medicine more often than directed.  Talk to your pediatrician regarding the use of this medicine in children. While this drug may be prescribed for children as young as 12 years of age for selected conditions, precautions do apply. Children and teenagers should not use this medicine to treat chicken pox or flu symptoms unless directed by a doctor.  Patients over 65 years old may have a stronger reaction and need a smaller dose.  Overdosage: If you think you have taken too much of this medicine contact a poison control center or emergency room at once.  NOTE: This medicine is only for you. Do not share this medicine with others.  What if I miss a dose?  If you are taking this medicine on a regular schedule and miss a dose, take it as soon as you can. If it is almost time for your next dose, take only that dose. Do not take double or extra doses.  What may interact with this medicine?  Do not take this medicine with any of the following medications:  -cidofovir  -ketorolac  -probenecid  This medicine may also interact with the following medications:  -alcohol  -alendronate  -bismuth subsalicylate  -flavocoxid  -herbal supplements like feverfew, garlic, mili, ginkgo biloba, horse chestnut  -medicines for diabetes or glaucoma like acetazolamide, methazolamide  -medicines for gout  -medicines that treat or prevent blood clots like enoxaparin, heparin, ticlopidine, warfarin  -other aspirin and aspirin-like medicines  -NSAIDs, medicines for pain and inflammation, like ibuprofen or  naproxen  -pemetrexed  -sulfinpyrazone  -varicella live vaccine  This list may not describe all possible interactions. Give your health care provider a list of all the medicines, herbs, non-prescription drugs, or dietary supplements you use. Also tell them if you smoke, drink alcohol, or use illegal drugs. Some items may interact with your medicine.  What should I watch for while using this medicine?  If you are treating yourself for pain, tell your doctor or health care professional if the pain lasts more than 10 days, if it gets worse, or if there is a new or different kind of pain. Tell your doctor if you see redness or swelling. Also, check with your doctor if you have a fever that lasts for more than 3 days. Only take this medicine to prevent heart attacks or blood clotting if prescribed by your doctor or health care professional.  Do not take aspirin or aspirin-like medicines with this medicine. Too much aspirin can be dangerous. Always read the labels carefully.  This medicine can irritate your stomach or cause bleeding problems. Do not smoke cigarettes or drink alcohol while taking this medicine. Do not lie down for 30 minutes after taking this medicine to prevent irritation to your throat.  If you are scheduled for any medical or dental procedure, tell your healthcare provider that you are taking this medicine. You may need to stop taking this medicine before the procedure.  This medicine may be used to treat migraines. If you take migraine medicines for 10 or more days a month, your migraines may get worse. Keep a diary of headache days and medicine use. Contact your healthcare professional if your migraine attacks occur more frequently.  What side effects may I notice from receiving this medicine?  Side effects that you should report to your doctor or health care professional as soon as possible:  -allergic reactions like skin rash, itching or hives, swelling of the face, lips, or tongue  -breathing  problems  -changes in hearing, ringing in the ears  -confusion  -general ill feeling or flu-like symptoms  -pain on swallowing  -redness, blistering, peeling or loosening of the skin, including inside the mouth or nose  -signs and symptoms of bleeding such as bloody or black, tarry stools; red or dark-brown urine; spitting up blood or brown material that looks like coffee grounds; red spots on the skin; unusual bruising or bleeding from the eye, gums, or nose  -trouble passing urine or change in the amount of urine  -unusually weak or tired  -yellowing of the eyes or skin  Side effects that usually do not require medical attention (report to your doctor or health care professional if they continue or are bothersome):  -diarrhea or constipation  -headache  -nausea, vomiting  -stomach gas, heartburn  This list may not describe all possible side effects. Call your doctor for medical advice about side effects. You may report side effects to FDA at 6-045-FDA-8664.  Where should I keep my medicine?  Keep out of the reach of children.  Store at room temperature between 15 and 30 degrees C (59 and 86 degrees F). Protect from heat and moisture. Do not use this medicine if it has a strong vinegar smell. Throw away any unused medicine after the expiration date.  NOTE: This sheet is a summary. It may not cover all possible information. If you have questions about this medicine, talk to your doctor, pharmacist, or health care provider.  © 2018 [employer] (2014-08-19 11:30:31)      · Is patient discharged on Warfarin / Coumadin?   No     Depression / Suicide Risk    As you are discharged from this [facility] facility, it is important to learn how to keep safe from harming yourself.    Recognize the warning signs:  · Abrupt changes in personality, positive or negative- including increase in energy   · Giving away possessions  · Change in eating patterns- significant weight changes-  positive or negative  · Change in  sleeping patterns- unable to sleep or sleeping all the time   · Unwillingness or inability to communicate  · Depression  · Unusual sadness, discouragement and loneliness  · Talk of wanting to die  · Neglect of personal appearance   · Rebelliousness- reckless behavior  · Withdrawal from people/activities they love  · Confusion- inability to concentrate     If you or a loved one observes any of these behaviors or has concerns about self-harm, here's what you can do:  · Talk about it- your feelings and reasons for harming yourself  · Remove any means that you might use to hurt yourself (examples: pills, rope, extension cords, firearm)  · Get professional help from the community (Mental Health, Substance Abuse, psychological counseling)  · Do not be alone:Call your Safe Contact- someone whom you trust who will be there for you.  · Call your local CRISIS HOTLINE 903-1229 or 426-537-4078  · Call your local Children's Mobile Crisis Response Team [place] (581) 196-3122 or www.[employer]  · Call the toll free National Suicide Prevention Hotlines   · National Suicide Prevention Lifeline 297-462-TMKX (9066)  · National Hope Line Network 800-SUICIDE (127-5888)        Total Knee Replacement, Care After  These instructions give you information about caring for yourself after your procedure. Your doctor may also give you more specific instructions. Call your doctor if you have any problems or questions after your procedure.  Follow these instructions at home:  Medicines  · Take over-the-counter and prescription medicines only as told by your doctor.  · If you were prescribed an antibiotic medicine, take it as told by your doctor. Do not stop taking the antibiotic even if you start to feel better.  · If you were prescribed a blood thinner (anticoagulant), take it as told by your doctor.  If you have a splint or brace:  · Wear the splint or brace as told by your doctor. Remove it only as told by your doctor.  · Loosen  the splint or brace if your toes tingle, get numb, or turn cold and blue.  · Do not let your splint or brace get wet if it is not waterproof.  · Keep the splint or brace clean.  Bathing  · Do not take baths, swim, or use a hot tub until your doctor says it is okay. Ask your doctor if you can take showers. You may only be allowed to take sponge baths for bathing.  · If you have a splint or brace that is not waterproof, cover it with a watertight covering when you take a bath or a shower.  · Keep your bandage (dressing) dry until your doctor says it can be taken off.  Incision care and drain care  · Check your cut from surgery (incision) and your drain every day for signs of infection. Check for:  ¨ More redness, swelling, or pain.  ¨ More fluid or blood.  ¨ Warmth.  ¨ Pus or a bad smell.  · Follow instructions from your doctor about how to take care of your cut from surgery. Make sure you:  ¨ Wash your hands with soap and water before you change your bandage. If you cannot use soap and water, use hand .  ¨ Change your bandage as told by your doctor.  ¨ Leave stitches (sutures), skin glue, or skin tape (adhesive) strips in place. They may need to stay in place for 2 weeks or longer. If tape strips get loose and curl up, you may trim the loose edges. Do not remove tape strips completely unless your doctor says it is okay.  · If you have a drain, follow instructions from your doctor about caring for it. Do not remove the drain tube or any bandages unless your doctor says it is okay.  Managing pain, stiffness, and swelling  · If directed, put ice on your knee.  ¨ Put ice in a plastic bag.  ¨ Place a towel between your skin and the bag.  ¨ Leave the ice on for 20 minutes, 2-3 times per day.  · If directed, apply heat to the affected area as often as told by your doctor. Use the heat source that your doctor recommends, such as a moist heat pack or a heating pad.  ¨ Place a towel between your skin and the heat  source.  ¨ Leave the heat on for 20-30 minutes.  ¨ Remove the heat if your skin turns bright red. This is especially important if you are unable to feel pain, heat, or cold. You may have a greater risk of getting burned.  · Move your toes often to avoid stiffness and to lessen swelling.  · Raise (elevate) your knee above the level of your heart while you are sitting or lying down.  · Wear elastic knee support for as long as told by your doctor.  Driving  · Do not drive until your doctor says it is okay. Ask your doctor when it is safe to drive if you have a splint or brace on your knee.  · Do not drive or use heavy machinery while taking prescription pain medicine.  · Do not drive for 24 hours if you received a sedative.  Activity  · Do not lift anything that is heavier than 10 lb (4.5 kg) until your doctor says it is okay.  · Do not play contact sports until your doctor says it is okay.  · Avoid high-impact activities, including running, jumping rope, and jumping jacks.  · Avoid sitting for a long time without moving. Get up and move around at least every few hours.  · If physical therapy was prescribed, do exercises as told by your doctor.  · Return to your normal activities as told by your doctor. Ask your doctor what activities are safe for you.  Safety  · Do not use your leg to support your body weight until your doctor says that you can. Use crutches or a walker as told by your doctor.  General instructions  · Do not have any dental work done for at least 3 months after your surgery. When you do have dental work done, tell your dentist about your joint replacement.  · Do not use any tobacco products, such as cigarettes, chewing tobacco, or e-cigarettes. If you need help quitting, ask your doctor.  · Wear special socks (compression stockings) as told by your doctor.  · If you have been sent home with a knee joint motion machine (continuous passive motion machine), use it as told by your doctor.  · Drink enough  fluid to keep your pee (urine) clear or pale yellow.  · If you have been told to lose weight, follow instructions from your doctor about how to do this safely.  · Keep all follow-up visits as told by your doctor. This is important.  Contact a doctor if:  · You have more redness, swelling, or pain around your cut from surgery or your drain.  · You have more fluid or blood coming from your cut from surgery or your drain.  · Your cut from surgery or your drain area feels warm to the touch.  · You have pus or a bad smell coming from your cut from surgery or your drain.  · You have a fever.  · Your cut breaks open after your doctor removes your stitches, skin glue, or skin tape strips.  · Your new joint feels loose.  · You have knee pain that does not go away.  Get help right away if:  · You have a rash.  · You have pain in your calf or thigh.  · You have swelling in your calf or thigh.  · You have shortness of breath.  · You have trouble breathing.  · You have chest pain.  · Your ability to move your knee is getting worse.  This information is not intended to replace advice given to you by your health care provider. Make sure you discuss any questions you have with your health care provider.  Document Released: 03/11/2013 Document Revised: 08/21/2017 Document Reviewed: 11/23/2016  [employer] Patient Education © 2017 [employer]

## 2019-03-21 NOTE — PROGRESS NOTES
Blood pressure 111/68, pulse 67, temperature 36.1 °C (97 °F), temperature source Temporal, resp. rate 16, height 1.829 m (6'), weight 84.7 kg (186 lb 11.7 oz), SpO2 97 %.    Patient discharged home with family at bedside.  Discharge paper work and opiate consent reviewed and signed.  Patient received vaccines or is already up to date.  PIV removed.  Rx prescription given to patient. Further care as an outpatient.

## 2019-04-11 ENCOUNTER — APPOINTMENT (OUTPATIENT)
Dept: RADIOLOGY | Facility: MEDICAL CENTER | Age: 84
DRG: 384 | End: 2019-04-11
Attending: EMERGENCY MEDICINE
Payer: COMMERCIAL

## 2019-04-11 ENCOUNTER — HOSPITAL ENCOUNTER (INPATIENT)
Facility: MEDICAL CENTER | Age: 84
LOS: 4 days | DRG: 384 | End: 2019-04-15
Attending: EMERGENCY MEDICINE | Admitting: HOSPITALIST
Payer: COMMERCIAL

## 2019-04-11 DIAGNOSIS — I95.1 ORTHOSTASIS: ICD-10-CM

## 2019-04-11 DIAGNOSIS — K92.2 GASTROINTESTINAL HEMORRHAGE, UNSPECIFIED GASTROINTESTINAL HEMORRHAGE TYPE: ICD-10-CM

## 2019-04-11 DIAGNOSIS — D64.9 ANEMIA, UNSPECIFIED TYPE: ICD-10-CM

## 2019-04-11 PROBLEM — I48.0 PAROXYSMAL ATRIAL FIBRILLATION (HCC): Status: ACTIVE | Noted: 2019-04-11

## 2019-04-11 PROBLEM — D62 ANEMIA DUE TO ACUTE BLOOD LOSS: Status: ACTIVE | Noted: 2019-04-11

## 2019-04-11 PROBLEM — E03.9 ACQUIRED HYPOTHYROIDISM: Status: ACTIVE | Noted: 2019-04-11

## 2019-04-11 LAB
ABO GROUP BLD: NORMAL
ALBUMIN SERPL BCP-MCNC: 3.7 G/DL (ref 3.2–4.9)
ALBUMIN/GLOB SERPL: 1.2 G/DL
ALP SERPL-CCNC: 71 U/L (ref 30–99)
ALT SERPL-CCNC: 5 U/L (ref 2–50)
ANION GAP SERPL CALC-SCNC: 6 MMOL/L (ref 0–11.9)
APTT PPP: 33.1 SEC (ref 24.7–36)
AST SERPL-CCNC: 16 U/L (ref 12–45)
BARCODED ABORH UBTYP: 5100
BARCODED PRD CODE UBPRD: NORMAL
BARCODED UNIT NUM UBUNT: NORMAL
BASOPHILS # BLD AUTO: 0.8 % (ref 0–1.8)
BASOPHILS # BLD: 0.06 K/UL (ref 0–0.12)
BILIRUB SERPL-MCNC: 0.4 MG/DL (ref 0.1–1.5)
BLD GP AB SCN SERPL QL: NORMAL
BUN SERPL-MCNC: 40 MG/DL (ref 8–22)
CALCIUM SERPL-MCNC: 9.1 MG/DL (ref 8.5–10.5)
CFT BLD TEG: 7.1 MIN (ref 5–10)
CHLORIDE SERPL-SCNC: 109 MMOL/L (ref 96–112)
CLOT ANGLE BLD TEG: 72.1 DEGREES (ref 53–72)
CLOT LYSIS 30M P MA LENFR BLD TEG: 0.1 % (ref 0–8)
CO2 SERPL-SCNC: 22 MMOL/L (ref 20–33)
COMPONENT R 8504R: NORMAL
CREAT SERPL-MCNC: 1.34 MG/DL (ref 0.5–1.4)
CT.EXTRINSIC BLD ROTEM: 1.2 MIN (ref 1–3)
EKG IMPRESSION: NORMAL
EOSINOPHIL # BLD AUTO: 0.11 K/UL (ref 0–0.51)
EOSINOPHIL NFR BLD: 1.4 % (ref 0–6.9)
ERYTHROCYTE [DISTWIDTH] IN BLOOD BY AUTOMATED COUNT: 56.3 FL (ref 35.9–50)
GLOBULIN SER CALC-MCNC: 3.1 G/DL (ref 1.9–3.5)
GLUCOSE SERPL-MCNC: 92 MG/DL (ref 65–99)
HCT VFR BLD AUTO: 25.5 % (ref 42–52)
HGB BLD-MCNC: 7.6 G/DL (ref 14–18)
HGB BLD-MCNC: 7.9 G/DL (ref 14–18)
IMM GRANULOCYTES # BLD AUTO: 0.07 K/UL (ref 0–0.11)
IMM GRANULOCYTES NFR BLD AUTO: 0.9 % (ref 0–0.9)
INR PPP: 1.13 (ref 0.87–1.13)
LYMPHOCYTES # BLD AUTO: 1 K/UL (ref 1–4.8)
LYMPHOCYTES NFR BLD: 12.8 % (ref 22–41)
MCF BLD TEG: 72.9 MM (ref 50–70)
MCH RBC QN AUTO: 32.5 PG (ref 27–33)
MCHC RBC AUTO-ENTMCNC: 31 G/DL (ref 33.7–35.3)
MCV RBC AUTO: 104.9 FL (ref 81.4–97.8)
MONOCYTES # BLD AUTO: 0.54 K/UL (ref 0–0.85)
MONOCYTES NFR BLD AUTO: 6.9 % (ref 0–13.4)
NEUTROPHILS # BLD AUTO: 6.03 K/UL (ref 1.82–7.42)
NEUTROPHILS NFR BLD: 77.2 % (ref 44–72)
NRBC # BLD AUTO: 0 K/UL
NRBC BLD-RTO: 0 /100 WBC
PLATELET # BLD AUTO: 282 K/UL (ref 164–446)
PMV BLD AUTO: 10.3 FL (ref 9–12.9)
POTASSIUM SERPL-SCNC: 4.6 MMOL/L (ref 3.6–5.5)
PRODUCT TYPE UPROD: NORMAL
PROT SERPL-MCNC: 6.8 G/DL (ref 6–8.2)
PROTHROMBIN TIME: 14.6 SEC (ref 12–14.6)
RBC # BLD AUTO: 2.43 M/UL (ref 4.7–6.1)
RH BLD: NORMAL
SODIUM SERPL-SCNC: 137 MMOL/L (ref 135–145)
TEG ALGORITHM TGALG: ABNORMAL
TROPONIN I SERPL-MCNC: <0.01 NG/ML (ref 0–0.04)
UNIT STATUS USTAT: NORMAL
WBC # BLD AUTO: 7.8 K/UL (ref 4.8–10.8)

## 2019-04-11 PROCEDURE — 86923 COMPATIBILITY TEST ELECTRIC: CPT

## 2019-04-11 PROCEDURE — 30233N1 TRANSFUSION OF NONAUTOLOGOUS RED BLOOD CELLS INTO PERIPHERAL VEIN, PERCUTANEOUS APPROACH: ICD-10-PCS | Performed by: INTERNAL MEDICINE

## 2019-04-11 PROCEDURE — 85018 HEMOGLOBIN: CPT

## 2019-04-11 PROCEDURE — 36415 COLL VENOUS BLD VENIPUNCTURE: CPT

## 2019-04-11 PROCEDURE — 85610 PROTHROMBIN TIME: CPT

## 2019-04-11 PROCEDURE — C9113 INJ PANTOPRAZOLE SODIUM, VIA: HCPCS | Performed by: HOSPITALIST

## 2019-04-11 PROCEDURE — 86900 BLOOD TYPING SEROLOGIC ABO: CPT

## 2019-04-11 PROCEDURE — 770020 HCHG ROOM/CARE - TELE (206)

## 2019-04-11 PROCEDURE — 99285 EMERGENCY DEPT VISIT HI MDM: CPT

## 2019-04-11 PROCEDURE — 85025 COMPLETE CBC W/AUTO DIFF WBC: CPT

## 2019-04-11 PROCEDURE — 80053 COMPREHEN METABOLIC PANEL: CPT

## 2019-04-11 PROCEDURE — 85730 THROMBOPLASTIN TIME PARTIAL: CPT

## 2019-04-11 PROCEDURE — 85347 COAGULATION TIME ACTIVATED: CPT

## 2019-04-11 PROCEDURE — 700111 HCHG RX REV CODE 636 W/ 250 OVERRIDE (IP): Performed by: HOSPITALIST

## 2019-04-11 PROCEDURE — 85576 BLOOD PLATELET AGGREGATION: CPT

## 2019-04-11 PROCEDURE — 99223 1ST HOSP IP/OBS HIGH 75: CPT | Performed by: HOSPITALIST

## 2019-04-11 PROCEDURE — 85384 FIBRINOGEN ACTIVITY: CPT

## 2019-04-11 PROCEDURE — 71045 X-RAY EXAM CHEST 1 VIEW: CPT

## 2019-04-11 PROCEDURE — 700105 HCHG RX REV CODE 258: Performed by: HOSPITALIST

## 2019-04-11 PROCEDURE — 86850 RBC ANTIBODY SCREEN: CPT

## 2019-04-11 PROCEDURE — 700105 HCHG RX REV CODE 258: Performed by: EMERGENCY MEDICINE

## 2019-04-11 PROCEDURE — 86901 BLOOD TYPING SEROLOGIC RH(D): CPT

## 2019-04-11 PROCEDURE — 84484 ASSAY OF TROPONIN QUANT: CPT

## 2019-04-11 PROCEDURE — 93005 ELECTROCARDIOGRAM TRACING: CPT | Performed by: EMERGENCY MEDICINE

## 2019-04-11 PROCEDURE — 36430 TRANSFUSION BLD/BLD COMPNT: CPT

## 2019-04-11 PROCEDURE — P9016 RBC LEUKOCYTES REDUCED: HCPCS

## 2019-04-11 RX ORDER — ACETAMINOPHEN 500 MG
1000 TABLET ORAL EVERY 6 HOURS PRN
COMMUNITY
End: 2019-07-17

## 2019-04-11 RX ORDER — AMIODARONE HYDROCHLORIDE 200 MG/1
100 TABLET ORAL DAILY
Status: DISCONTINUED | OUTPATIENT
Start: 2019-04-12 | End: 2019-04-15 | Stop reason: HOSPADM

## 2019-04-11 RX ORDER — LEVOTHYROXINE SODIUM 0.05 MG/1
50 TABLET ORAL
Status: DISCONTINUED | OUTPATIENT
Start: 2019-04-12 | End: 2019-04-15 | Stop reason: HOSPADM

## 2019-04-11 RX ORDER — ASPIRIN 81 MG/1
81 TABLET, CHEWABLE ORAL 2 TIMES DAILY
Status: ON HOLD | COMMUNITY
End: 2019-04-15

## 2019-04-11 RX ORDER — SODIUM CHLORIDE 9 MG/ML
INJECTION, SOLUTION INTRAVENOUS CONTINUOUS
Status: DISCONTINUED | OUTPATIENT
Start: 2019-04-11 | End: 2019-04-13

## 2019-04-11 RX ORDER — SODIUM CHLORIDE 9 MG/ML
INJECTION, SOLUTION INTRAVENOUS CONTINUOUS
Status: DISCONTINUED | OUTPATIENT
Start: 2019-04-11 | End: 2019-04-11

## 2019-04-11 RX ORDER — BISACODYL 10 MG
10 SUPPOSITORY, RECTAL RECTAL
Status: DISCONTINUED | OUTPATIENT
Start: 2019-04-11 | End: 2019-04-15 | Stop reason: HOSPADM

## 2019-04-11 RX ORDER — CELECOXIB 200 MG/1
200 CAPSULE ORAL DAILY
Status: ON HOLD | COMMUNITY
End: 2019-04-15

## 2019-04-11 RX ORDER — HYDROCODONE BITARTRATE AND ACETAMINOPHEN 10; 325 MG/1; MG/1
.5-1 TABLET ORAL EVERY 4 HOURS PRN
COMMUNITY
End: 2019-07-17

## 2019-04-11 RX ORDER — SODIUM CHLORIDE 9 MG/ML
1000 INJECTION, SOLUTION INTRAVENOUS ONCE
Status: COMPLETED | OUTPATIENT
Start: 2019-04-11 | End: 2019-04-11

## 2019-04-11 RX ORDER — AMOXICILLIN 250 MG
2 CAPSULE ORAL 2 TIMES DAILY
Status: DISCONTINUED | OUTPATIENT
Start: 2019-04-11 | End: 2019-04-15 | Stop reason: HOSPADM

## 2019-04-11 RX ORDER — TRAMADOL HYDROCHLORIDE 50 MG/1
50 TABLET ORAL EVERY 4 HOURS PRN
COMMUNITY
End: 2019-07-17

## 2019-04-11 RX ORDER — TRAMADOL HYDROCHLORIDE 50 MG/1
50 TABLET ORAL EVERY 4 HOURS PRN
Status: DISCONTINUED | OUTPATIENT
Start: 2019-04-11 | End: 2019-04-15 | Stop reason: HOSPADM

## 2019-04-11 RX ORDER — POLYETHYLENE GLYCOL 3350 17 G/17G
1 POWDER, FOR SOLUTION ORAL
Status: DISCONTINUED | OUTPATIENT
Start: 2019-04-11 | End: 2019-04-15 | Stop reason: HOSPADM

## 2019-04-11 RX ORDER — ACETAMINOPHEN 325 MG/1
650 TABLET ORAL EVERY 6 HOURS PRN
Status: DISCONTINUED | OUTPATIENT
Start: 2019-04-11 | End: 2019-04-15 | Stop reason: HOSPADM

## 2019-04-11 RX ADMIN — SODIUM CHLORIDE: 9 INJECTION, SOLUTION INTRAVENOUS at 18:22

## 2019-04-11 RX ADMIN — SODIUM CHLORIDE 1000 ML: 9 INJECTION, SOLUTION INTRAVENOUS at 13:38

## 2019-04-11 RX ADMIN — SODIUM CHLORIDE 8 MG/HR: 9 INJECTION, SOLUTION INTRAVENOUS at 19:46

## 2019-04-11 ASSESSMENT — ENCOUNTER SYMPTOMS
FEVER: 0
SHORTNESS OF BREATH: 0
CHILLS: 0

## 2019-04-11 ASSESSMENT — COGNITIVE AND FUNCTIONAL STATUS - GENERAL
CLIMB 3 TO 5 STEPS WITH RAILING: A LITTLE
SUGGESTED CMS G CODE MODIFIER DAILY ACTIVITY: CH
DAILY ACTIVITIY SCORE: 24
MOBILITY SCORE: 23
SUGGESTED CMS G CODE MODIFIER MOBILITY: CI

## 2019-04-11 ASSESSMENT — LIFESTYLE VARIABLES: ALCOHOL_USE: NO

## 2019-04-11 ASSESSMENT — PATIENT HEALTH QUESTIONNAIRE - PHQ9
2. FEELING DOWN, DEPRESSED, IRRITABLE, OR HOPELESS: NOT AT ALL
SUM OF ALL RESPONSES TO PHQ9 QUESTIONS 1 AND 2: 0
1. LITTLE INTEREST OR PLEASURE IN DOING THINGS: NOT AT ALL

## 2019-04-11 NOTE — ED PROVIDER NOTES
ED Provider Note    CHIEF COMPLAINT  Chief Complaint   Patient presents with   • Bloody Stools     x4 days.        HPI  Rc Auguste is a 87 y.o. male who presents feeling generally weak, dizzy upon standing, short of breath with exertion over the past 4 days.  He has had red blood in his stool over the past 4 days.  Patient believes it may be related to MiraLAX he is taking, citing a history of constipation in the past.  Patient is currently taking aspirin twice a day over the past 4 weeks following knee surgery.  Patient found to be in atrial fibrillation today, states he does not normally take blood thinners for this problem.  No acute numbness or weakness in the extremities.  Patient's weakness is described as general.  Review of chart shows previous colonoscopy in 2011 revealed evidence of diverticulosis, patient was having bleeding at the time and felt to be possible source.  His wife and daughter at the bedside both state he has history of hemorrhoids.  Previous GI bleed in 2011 required blood transfusion.    REVIEW OF SYSTEMS    Constitutional: Generalized weakness, no fever  Respiratory: Shortness of breath with exertion  Cardiac: No syncope, no chest pain  Gastrointestinal: Bloody stool, denies abdominal pain  Musculoskeletal: No acute neck or back pain  Neurologic: Denies headache       All other systems are negative.       PAST MEDICAL HISTORY  Past Medical History:   Diagnosis Date   • Arrhythmia     a fib   • Back pain    • Constipation    • Dental disorder     Upper   • Disorder of thyroid        FAMILY HISTORY  Family History   Problem Relation Age of Onset   • Cancer Mother         Liver CAncer   • Heart Disease Father        SOCIAL HISTORY  Social History     Social History   • Marital status:      Spouse name: N/A   • Number of children: N/A   • Years of education: N/A     Social History Main Topics   • Smoking status: Former Smoker     Packs/day: 2.00     Years: 20.00     Types:  Cigarettes     Quit date: 1/1/1966   • Smokeless tobacco: Never Used   • Alcohol use 7.0 oz/week     14 Glasses of wine per week      Comment: 2/day   • Drug use: No   • Sexual activity: Not Currently     Partners: Female     Other Topics Concern   • Not on file     Social History Narrative   • No narrative on file       SURGICAL HISTORY  Past Surgical History:   Procedure Laterality Date   • PB TOTAL KNEE ARTHROPLASTY Left 3/20/2019    Procedure: KNEE ARTHROPLASTY TOTAL;  Surgeon: Camden Mary M.D.;  Location: SURGERY [facility];  Service: Orthopedics   • LUMBAR LAMINECTOMY DISKECTOMY  2014    Performed by Dr. Quinn   • COLONOSCOPY - ENDO  4/16/2011    Performed by JEANNINE VERDE at ENDOSCOPY [facility]   • KNEE REPLACEMENT, TOTAL Right    • OTHER CARDIAC SURGERY      angiogram   • OTHER ORTHOPEDIC SURGERY      knees scope       CURRENT MEDICATIONS  Home Medications     Reviewed by Jeannine Payne (Pharmacy Tech) on 04/11/19 at 1412  Med List Status: Complete   Medication Last Dose Status   acetaminophen (TYLENOL) 500 MG Tab PRN Active   amiodarone (PACERONE) 100 MG tablet 4/10/2019 Active   Ascorbic Acid (VITAMIN C PO) 4/11/2019 Active   aspirin (ASA) 81 MG Chew Tab chewable tablet 4/11/2019 Active   CALCIUM PO 4/11/2019 Active   celecoxib (CELEBREX) 200 MG Cap 4/11/2019 Active   docosahexanoic acid (OMEGA 3) 1000 MG CAPS 4/11/2019 Active   HYDROcodone/acetaminophen (NORCO)  MG Tab 4/11/2019 Active   levothyroxine (SYNTHROID) 50 MCG Tab 4/11/2019 Active   Multiple Vitamin (MULTI-VITAMIN PO) 4/11/2019 Active   tramadol (ULTRAM) 50 MG Tab FEW DAYS AGO Active                ALLERGIES  Allergies   Allergen Reactions   • Nkda [No Known Drug Allergy]        PHYSICAL EXAM  VITAL SIGNS: /58   Pulse 81   Temp 36.6 °C (97.8 °F)   Resp 17   Ht 1.829 m (6')   Wt 81.6 kg (180 lb)   SpO2 95%   BMI 24.41 kg/m²   Constitutional:  Non-toxic appearance.  Pale.  HENT: No facial swelling.  Mucous  membranes mostly dry  Eyes: Anicteric, no conjunctivitis.     Cardiovascular: Normal heart rate, irregular rhythm  Pulmonary:  No wheezing, No rales.   Gastrointestinal: Soft, No tenderness, No masses.  Rectal exam red bloody stool on digital exam.  Skin: Warm, Dry, No cyanosis.  Pallor.  Neurologic: Speech is clear, follows commands, facial expression is symmetrical.  Strength and sensation equal in the extremities  Psychiatric: Affect normal,  Mood normal.  Patient is calm and cooperative  Musculoskeletal: No flank tenderness, no chest wall tenderness    EKG/Labs  Results for orders placed or performed during the hospital encounter of 04/11/19   CBC WITH DIFFERENTIAL   Result Value Ref Range    WBC 7.8 4.8 - 10.8 K/uL    RBC 2.43 (L) 4.70 - 6.10 M/uL    Hemoglobin 7.9 (L) 14.0 - 18.0 g/dL    Hematocrit 25.5 (L) 42.0 - 52.0 %    .9 (H) 81.4 - 97.8 fL    MCH 32.5 27.0 - 33.0 pg    MCHC 31.0 (L) 33.7 - 35.3 g/dL    RDW 56.3 (H) 35.9 - 50.0 fL    Platelet Count 282 164 - 446 K/uL    MPV 10.3 9.0 - 12.9 fL    Neutrophils-Polys 77.20 (H) 44.00 - 72.00 %    Lymphocytes 12.80 (L) 22.00 - 41.00 %    Monocytes 6.90 0.00 - 13.40 %    Eosinophils 1.40 0.00 - 6.90 %    Basophils 0.80 0.00 - 1.80 %    Immature Granulocytes 0.90 0.00 - 0.90 %    Nucleated RBC 0.00 /100 WBC    Neutrophils (Absolute) 6.03 1.82 - 7.42 K/uL    Lymphs (Absolute) 1.00 1.00 - 4.80 K/uL    Monos (Absolute) 0.54 0.00 - 0.85 K/uL    Eos (Absolute) 0.11 0.00 - 0.51 K/uL    Baso (Absolute) 0.06 0.00 - 0.12 K/uL    Immature Granulocytes (abs) 0.07 0.00 - 0.11 K/uL    NRBC (Absolute) 0.00 K/uL   COMP METABOLIC PANEL   Result Value Ref Range    Sodium 137 135 - 145 mmol/L    Potassium 4.6 3.6 - 5.5 mmol/L    Chloride 109 96 - 112 mmol/L    Co2 22 20 - 33 mmol/L    Anion Gap 6.0 0.0 - 11.9    Glucose 92 65 - 99 mg/dL    Bun 40 (H) 8 - 22 mg/dL    Creatinine 1.34 0.50 - 1.40 mg/dL    Calcium 9.1 8.5 - 10.5 mg/dL    AST(SGOT) 16 12 - 45 U/L    ALT(SGPT) 5  2 - 50 U/L    Alkaline Phosphatase 71 30 - 99 U/L    Total Bilirubin 0.4 0.1 - 1.5 mg/dL    Albumin 3.7 3.2 - 4.9 g/dL    Total Protein 6.8 6.0 - 8.2 g/dL    Globulin 3.1 1.9 - 3.5 g/dL    A-G Ratio 1.2 g/dL   TROPONIN   Result Value Ref Range    Troponin I <0.01 0.00 - 0.04 ng/mL   COD (ADULT)   Result Value Ref Range    ABO Grouping Only O     Rh Grouping Only POS     Antibody Screen-Cod NEG     Component R       R7                  Red Blood Cells7    [id number]   issued       19   14:16      Product Type Red Blood Cells LR Pheresis     Dispense Status Issued     Unit Number (Barcoded) [id number]     Product Code (Barcoded) V1025I13     Blood Type (Barcoded) 5100    Prothrombin Time   Result Value Ref Range    PT 14.6 12.0 - 14.6 sec    INR 1.13 0.87 - 1.13   APTT   Result Value Ref Range    APTT 33.1 24.7 - 36.0 sec   ESTIMATED GFR   Result Value Ref Range    GFR If African American >60 >60 mL/min/1.73 m 2    GFR If Non African American 50 (A) >60 mL/min/1.73 m 2   EKG (NOW)   Result Value Ref Range    Report       [facility] Emergency Dept.    Test Date:  2019  Pt Name:    ELA COBB               Department: ER  MRN:        [id number]                      Room:       [facility]  Gender:     Male                         Technician: [id number]  :        [id number]                   Requested By:JOHN LIRIANO  Order #:    [id number]                    Reading MD:    Measurements  Intervals                                Axis  Rate:       70                           P:          18  MA:         156                          QRS:        29  QRSD:       102                          T:          33  QT:         448  QTc:        484    Interpretive Statements  Unknown rhythm, irregular rate  Low voltage, extremity leads  Borderline prolonged QT interval  Compared to ECG 2019 11:33:42  Sinus rhythm no longer present           RADIOLOGY/PROCEDURES  DX-CHEST-PORTABLE (1 VIEW)    Final Result      1.  There is no acute cardiopulmonary process.            COURSE & MEDICAL DECISION MAKING  Pertinent Labs & Imaging studies reviewed. (See chart for details)  On the heart monitor, patient was in and out of atrial fibrillation, EKG documents sinus rhythm with several irregular beat as well.  No ischemic change, negative troponin.  Patient's generalized weakness and shortness of breath likely secondary to blood loss, hemoglobin now at 7.9.  Patient had positive orthostatic vital signs, therefore started on IV fluids.  Elevated BUN to creatinine ratio may suggest dehydration or upper GI source of bleeding.  Gastroenterology was consulted and has seen the patient.  Patient is transfused 1 unit packed red blood cells given hemodynamic instability and evidence of active GI bleed.  Patient has remained stable in the ER.  HYDRATION: Based on the patient's presentation of Dehydration and Other GI bleed the patient was given IV fluids. IV Hydration was used because oral hydration was not adequate alone. Upon recheck following hydration, the patient was Improved, better hydration.      FINAL IMPRESSION     1. Gastrointestinal hemorrhage, unspecified gastrointestinal hemorrhage type    2. Anemia, unspecified type    3. Orthostasis           Critical care time 35 minutes         Electronically signed by: Ezequiel Barksdale, 4/11/2019 3:37 PM

## 2019-04-11 NOTE — ED TRIAGE NOTES
Chief Complaint   Patient presents with   • Bloody Stools     x4 days.      BIB [facility] for above. VSS. In gown, chart up for ERP.

## 2019-04-11 NOTE — ED NOTES
Verbalizes understanding of POC. PIV established, blood sent to lab and IVF infusing per MAR. VSS, call light within reach. Repeat blue top and second lavender sent for ABO.

## 2019-04-11 NOTE — H&P
Hospital Medicine History & Physical Note    Date of Service  4/11/2019    Primary Care Physician  Nestor Jiang M.D.    Consultants  GI    Code Status  full    Chief Complaint  dizziness    History of Presenting Illness  87 y.o. male who presented 4/11/2019 with 2-3 days of blood in his stools as well as 2-3 days of dizziness.  Mr. Auguste has a history of paroxysmal atrial fibrillation though not on anticoagulation therapy that underwent a left knee arthroplasty on 3/20/2019 by Dr. Mary.  He has been taking aspirin and Celebrex.  Presented to urgent care today with the after mentioned symptoms and was found to have hypotension with significant orthostatics with a blood pressure down to 80/56 when standing therefore was transferred here for higher level of care.  In the emergency room, he is found to have a hemoglobin of 7.9 and will be admitted for blood transfusion and GI consultation due to GI bleed.  The emergency room, patient's wife and daughter at bedside and we discussed his condition and the need for further workup.    Review of Systems  Review of Systems   Constitutional: Negative for chills and fever.   Respiratory: Negative for shortness of breath.    Cardiovascular: Negative for chest pain.        He has not had any syncopal episodes though has been dizzy   Gastrointestinal:        He states he has had dark stools that have been soft he thinks there may be a component of dark blood in them does not exactly sure about the color   All other systems reviewed and are negative.      Past Medical History   has a past medical history of Arrhythmia; Back pain; Constipation; Dental disorder; and Disorder of thyroid.    Surgical History   has a past surgical history that includes colonoscopy - endo (4/16/2011); lumbar laminectomy diskectomy (2014); knee replacement, total (Right); other cardiac surgery; other orthopedic surgery; and pr total knee arthroplasty (Left, 3/20/2019).     Family History  family  history includes Cancer in his mother; Heart Disease in his father.     Social History   reports that he quit smoking about 53 years ago. His smoking use included Cigarettes. He has a 40.00 pack-year smoking history. He has never used smokeless tobacco. He reports that he drinks about 7.0 oz of alcohol per week . He reports that he does not use drugs.    Allergies  Allergies   Allergen Reactions   • Nkda [No Known Drug Allergy]        Medications  Prior to Admission Medications   Prescriptions Last Dose Informant Patient Reported? Taking?   Ascorbic Acid (VITAMIN C PO) 4/11/2019 at AM Family Member Yes No   Sig: Take 1 Tab by mouth every day.   CALCIUM PO 4/11/2019 at AM Family Member Yes No   Sig: Take 1 Tab by mouth every day.   HYDROcodone/acetaminophen (NORCO)  MG Tab 4/11/2019 at 0300 Patient's Home Pharmacy Yes Yes   Sig: Take 0.5-1 Tabs by mouth every four hours as needed.   Multiple Vitamin (MULTI-VITAMIN PO) 4/11/2019 at AM Family Member Yes No   Sig: Take 1 Tab by mouth every day.   acetaminophen (TYLENOL) 500 MG Tab PRN at PRN Family Member Yes Yes   Sig: Take 1,000 mg by mouth every 6 hours as needed.   amiodarone (PACERONE) 100 MG tablet 4/10/2019 at PM Patient's Home Pharmacy Yes No   Sig: Take 100 mg by mouth every day.   aspirin (ASA) 81 MG Chew Tab chewable tablet 4/11/2019 at AM Family Member Yes Yes   Sig: Take 81 mg by mouth 2 Times a Day.   celecoxib (CELEBREX) 200 MG Cap 4/11/2019 at AM Patient's Home Pharmacy Yes Yes   Sig: Take 200 mg by mouth every day.   docosahexanoic acid (OMEGA 3) 1000 MG CAPS 4/11/2019 at AM Family Member Yes No   Sig: Take 1,000 mg by mouth every day.   levothyroxine (SYNTHROID) 50 MCG Tab 4/11/2019 at AM Patient's Home Pharmacy Yes No   Sig: Take 50 mcg by mouth Every morning on an empty stomach.   tramadol (ULTRAM) 50 MG Tab FEW DAYS AGO at PRN Family Member Yes Yes   Sig: Take 50 mg by mouth every four hours as needed.      Facility-Administered Medications:  None       Physical Exam  Temp:  [36.4 °C (97.6 °F)-36.7 °C (98.1 °F)] 36.7 °C (98.1 °F)  Pulse:  [56-81] 75  Resp:  [13-26] 13  BP: (107-124)/(58-66) 107/58  SpO2:  [93 %-95 %] 95 %    Physical Exam   Constitutional: He is oriented to person, place, and time. No distress.   HENT:   Head: Normocephalic and atraumatic.   Eyes:   Pale conjunctiva   Neck: Normal range of motion. Neck supple.   Cardiovascular: Normal rate and regular rhythm.    No murmur heard.  Pulmonary/Chest: Effort normal and breath sounds normal. No respiratory distress. He has no wheezes.   Abdominal: Soft. He exhibits no distension. There is no tenderness.   Musculoskeletal:   Left leg is more swollen than the right   Neurological: He is alert and oriented to person, place, and time.   Skin: Skin is warm. He is not diaphoretic. There is pallor.   Psychiatric: He has a normal mood and affect. His behavior is normal.   Nursing note and vitals reviewed.      Laboratory:  Recent Labs      04/11/19   1310   WBC  7.8   RBC  2.43*   HEMOGLOBIN  7.9*   HEMATOCRIT  25.5*   MCV  104.9*   MCH  32.5   MCHC  31.0*   RDW  56.3*   PLATELETCT  282   MPV  10.3     Recent Labs      04/11/19   1310   SODIUM  137   POTASSIUM  4.6   CHLORIDE  109   CO2  22   GLUCOSE  92   BUN  40*   CREATININE  1.34   CALCIUM  9.1     Recent Labs      04/11/19   1310   ALTSGPT  5   ASTSGOT  16   ALKPHOSPHAT  71   TBILIRUBIN  0.4   GLUCOSE  92     Recent Labs      04/11/19   1355   APTT  33.1   INR  1.13             Recent Labs      04/11/19   1310   TROPONINI  <0.01       Urinalysis:    No results found     Imaging:  DX-CHEST-PORTABLE (1 VIEW)   Final Result      1.  There is no acute cardiopulmonary process.            Assessment/Plan:  I anticipate this patient will require at least two midnights for appropriate medical management, necessitating inpatient admission.    * GI bleed- (present on admission)   Assessment & Plan    This is most consistent with an upper GI bleed given his  elevated BUN though this time patient's story of the color of his stools could be lower as well.  An NG will be placed in the emergency room to see if there is blood from the stomach  He had been on Celebrex and aspirin  IV Protonix drip has been ordered  Serial hemoglobins every 6 hours with orders to transfuse for hemoglobin less than 7  Gastroenterology has been consulted for EGD and possible colonoscopy if indicated  Replaced on IV fluids though will be judicious as we do not want to dilute his clotting factors.     Anemia due to acute blood loss- (present on admission)   Assessment & Plan    Hemoglobin is down to 7.9 and he is symptomatic and hypotensive therefore 1 unit packed red blood cells has been ordered     Paroxysmal atrial fibrillation (HCC)- (present on admission)   Assessment & Plan    History of atrial fibrillation for which she is on amiodarone and not on anticoagulation  Monitor on telemetry     Acquired hypothyroidism- (present on admission)   Assessment & Plan    Continue Synthroid     Status post left knee replacement- (present on admission)   Assessment & Plan    By Dr. Mary on 3/20/2019         VTE prophylaxis: SCDs

## 2019-04-11 NOTE — ASSESSMENT & PLAN NOTE
This is most consistent with an upper GI bleed given his elevated BUN though this time patient's story of the color of his stools could be lower as well.  He had been on Celebrex and aspirin  GI consulted, EGD showed Junior's esophagus and PUD.  Continue on BID omeprazole  Colonoscopy showed no active bleeding, hemorrhoids  Monitor for further bleeding, follow-up with [facility] as outpatient  Treatment for constipation, high-fiber diet

## 2019-04-11 NOTE — ED NOTES
Med Rec completed per patient's family and home pharmacy   Allergies reviewed  No ORAL antibiotics in last 30 days

## 2019-04-11 NOTE — CONSULTS
Date of Service:4/11/19    Consult Requested By: Ezequiel Barksdale M.D.    Reason for Consultation: Hematochezia    History of Present Illness:   Rc Auguste is a 87 y.o. gentleman who comes in today after feeling lightheaded and dizzy for the last 2 days.  He recently had a left knee replacement performed approximately 3 weeks ago by Dr. Mary which he tolerated well.  He was discharged home with Celebrex aspirin and tramadol along with Tylenol to take for any type of pain or discomfort.  He has been doing so for their directions.  He denies any abdominal pain nausea vomiting dysphagia odynophagia hematemesis coffee-ground emesis.  His last colonoscopy states was over 8 years ago in the outpatient setting.  He has some underlying atrial fibrillation which he treats with amiodarone and is doing well.  His last bowel movement was yesterday which was dark and bloody and he had one previous to that the day before.  He actually consumed some MiraLAX today without any results regarding her bowel movement.  He is noted to be anemic and subsequently is receiving a blood transfusion.  His lightheadedness and dizziness presents itself when he is ambulatory.  He denies any abdominal discomfort.  Review Of Systems:    Denies any headaches auditory abnormalities shortness of breath chest pain heartburn indigestion dysphagia odynophagia hematemesis coffee-ground emesis, abdominal pain, genitourinary abnormalities, lower extremity swelling rashes numbness tingling.    PMH:   Past Medical History:   Diagnosis Date   • Arrhythmia     a fib   • Back pain    • Constipation    • Dental disorder     Upper   • Disorder of thyroid          PSH:  Past Surgical History:   Procedure Laterality Date   • PB TOTAL KNEE ARTHROPLASTY Left 3/20/2019    Procedure: KNEE ARTHROPLASTY TOTAL;  Surgeon: Camden Mary M.D.;  Location: SURGERY [facility];  Service: Orthopedics   • LUMBAR LAMINECTOMY DISKECTOMY  2014    Performed by   Quinn   • COLONOSCOPY - ENDO  4/16/2011    Performed by JEANNINE VERDE at ENDOSCOPY [facility] ORS   • KNEE REPLACEMENT, TOTAL Right    • OTHER CARDIAC SURGERY      angiogram   • OTHER ORTHOPEDIC SURGERY      knees scope       FAMILY HX:  Family History   Problem Relation Age of Onset   • Cancer Mother         Liver CAncer   • Heart Disease Father        SOCIAL HX:  Social History     Social History   • Marital status:      Spouse name: N/A   • Number of children: N/A   • Years of education: N/A     Occupational History   • Not on file.     Social History Main Topics   • Smoking status: Former Smoker     Packs/day: 2.00     Years: 20.00     Types: Cigarettes     Quit date: 1/1/1966   • Smokeless tobacco: Never Used   • Alcohol use 7.0 oz/week     14 Glasses of wine per week      Comment: 2/day   • Drug use: No   • Sexual activity: Not Currently     Partners: Female     Other Topics Concern   • Not on file     Social History Narrative   • No narrative on file     History   Smoking Status   • Former Smoker   • Packs/day: 2.00   • Years: 20.00   • Types: Cigarettes   • Quit date: 1/1/1966   Smokeless Tobacco   • Never Used     History   Alcohol Use   • 7.0 oz/week   • 14 Glasses of wine per week     Comment: 2/day       Allergies/Intolerances:  Allergies   Allergen Reactions   • Nkda [No Known Drug Allergy]        History reviewed with the patient    Other Current Medications:    Current Facility-Administered Medications:   •  NS infusion, , Intravenous, Continuous, Ezequiel Barksdale M.D.    Current Outpatient Prescriptions:   •  celecoxib (CELEBREX) 200 MG Cap, Take 200 mg by mouth every day., Disp: , Rfl:   •  aspirin (ASA) 81 MG Chew Tab chewable tablet, Take 81 mg by mouth 2 Times a Day., Disp: , Rfl:   •  tramadol (ULTRAM) 50 MG Tab, Take 50 mg by mouth every four hours as needed., Disp: , Rfl:   •  HYDROcodone/acetaminophen (NORCO)  MG Tab, Take 0.5-1 Tabs by mouth every four hours as needed.,  Disp: , Rfl:   •  acetaminophen (TYLENOL) 500 MG Tab, Take 1,000 mg by mouth every 6 hours as needed., Disp: , Rfl:   •  amiodarone (PACERONE) 100 MG tablet, Take 100 mg by mouth every day., Disp: , Rfl:   •  Ascorbic Acid (VITAMIN C PO), Take 1 Tab by mouth every day., Disp: , Rfl:   •  CALCIUM PO, Take 1 Tab by mouth every day., Disp: , Rfl:   •  levothyroxine (SYNTHROID) 50 MCG Tab, Take 50 mcg by mouth Every morning on an empty stomach., Disp: , Rfl:   •  Multiple Vitamin (MULTI-VITAMIN PO), Take 1 Tab by mouth every day., Disp: , Rfl:   •  docosahexanoic acid (OMEGA 3) 1000 MG CAPS, Take 1,000 mg by mouth every day., Disp: , Rfl:   [unfilled]    Most Recent Vital Signs:  /58   Pulse 81   Temp 36.6 °C (97.8 °F)   Resp 17   Ht 1.829 m (6')   Wt 81.6 kg (180 lb)   SpO2 95%   BMI 24.41 kg/m²   Temp  Av.5 °C (97.7 °F)  Min: 36.4 °C (97.6 °F)  Max: 36.6 °C (97.8 °F)    Physical Exam:  General: Nontoxic, no acute distress  HEENT: sclera anicteric, PERRL, EOMI, MMM, no oral lesions  Neck: supple, no lymphadenopathy  Chest: CTAB, no r/r/w, normal work of breathing.  Cardiac: Regular, no murmurs no gallops heard  Abdomen: + bowel sounds, soft, non-tender, non-distended, no HSM  Extremities: No edema. No joint swelling.  Skin: no rashes or erythema, well-healing scar on the left knee.  Neuro: Alert and oriented times 3, non-focal exam    Pertinent Lab Results:  Recent Labs      19   1310   WBC  7.8      Recent Labs      19   1310   HEMOGLOBIN  7.9*   HEMATOCRIT  25.5*   MCV  104.9*   MCH  32.5   PLATELETCT  282         Recent Labs      19   1310   SODIUM  137   POTASSIUM  4.6   CHLORIDE  109   CO2  22   CREATININE  1.34        Recent Labs      19   1310   ALBUMIN  3.7      Recent Labs      19   1310  19   1355   ASTSGOT  16   --    ALTSGPT  5   --    TBILIRUBIN  0.4   --    ALKPHOSPHAT  71   --    GLOBULIN  3.1   --    INR   --   1.13        [unfilled]      Pertinent Micro:  Results     ** No results found for the last 168 hours. **        No results found for: BLOODCULTU, BLDCULT, BCHOLD     Studies:              Results for orders placed during the hospital encounter of 05/30/12   DX-CHEST-2 VIEWS    Impression No active disease.                                    Results for orders placed during the hospital encounter of 03/20/19   DX-KNEE 2- LEFT    Impression Left knee arthroplasty.                Results for orders placed during the hospital encounter of 03/10/05   DX-PELVIS-1 OR 2 VIEWS    Impression IMPRESSION:    COMMINUTION OF THE LEFT PUBIC BONE EXTENDING INTO THE LEFT SUPERIOR PUBIC   RAMUS.           Read By SHUBHAM MORIN MD on Mar 10 2005  1:05PM  : TON Transcription Date: Mar 11 2005  9:47AM  THIS DOCUMENT HAS BEEN ELECTRONICALLY SIGNED BY: SHUBHAM MORIN MD on Mar 11   2005 10:55AM        Results for orders placed during the hospital encounter of 11/23/16   HT-OZOC-ACUGQFXTDR (WITH 1-VIEW CXR) RIGHT    Impression Mildly displaced fractures of the right lateral fifth and sixth ribs.                                  IMPRESSION:     Hematochezia  Anemia  Recent left knee replacement      PLAN:     At the present time I would suggest admitting him to the hospital transfusing him with at least 2 units of packed red blood cells monitor his H&H every 6 hours and keep greater than 7 and 21 with blood transfusions.  I would start him on IV Protonix drip, make him n.p.o. ice chips are fine for the time being.  2 large IV bore needles for IV access would be appropriate along with IV hydration.  He does have an elevated BUN suggestive of an upper GI bleed.  However he is only had 2 dark stools and his last colonoscopy was over a decade ago.  Subsequently I would recommend both an EGD and a colonoscopy to for further evaluation.  He is agreeable to this and the plan will be an EGD colonoscopy.  Possible sources are ulcer disease  secondary to the NSAID use which may be located either in the gastric area duodenal or in the colon.  All questions were answered if there are any further concerns please feel free to give us call at [phone].  Discussed with IM. Will continue to follow    Frank Be M.D.

## 2019-04-12 ENCOUNTER — ANESTHESIA EVENT (OUTPATIENT)
Dept: SURGERY | Facility: MEDICAL CENTER | Age: 84
DRG: 384 | End: 2019-04-12
Payer: COMMERCIAL

## 2019-04-12 ENCOUNTER — ANESTHESIA (OUTPATIENT)
Dept: SURGERY | Facility: MEDICAL CENTER | Age: 84
DRG: 384 | End: 2019-04-12
Payer: COMMERCIAL

## 2019-04-12 LAB
EKG IMPRESSION: NORMAL
HGB BLD-MCNC: 7.6 G/DL (ref 14–18)
HGB BLD-MCNC: 8.2 G/DL (ref 14–18)
HGB BLD-MCNC: 8.5 G/DL (ref 14–18)

## 2019-04-12 PROCEDURE — 160035 HCHG PACU - 1ST 60 MINS PHASE I: Performed by: INTERNAL MEDICINE

## 2019-04-12 PROCEDURE — 99232 SBSQ HOSP IP/OBS MODERATE 35: CPT | Performed by: INTERNAL MEDICINE

## 2019-04-12 PROCEDURE — 93005 ELECTROCARDIOGRAM TRACING: CPT | Performed by: HOSPITALIST

## 2019-04-12 PROCEDURE — 700111 HCHG RX REV CODE 636 W/ 250 OVERRIDE (IP): Performed by: HOSPITALIST

## 2019-04-12 PROCEDURE — 160203 HCHG ENDO MINUTES - 1ST 30 MINS LEVEL 4: Performed by: INTERNAL MEDICINE

## 2019-04-12 PROCEDURE — 160048 HCHG OR STATISTICAL LEVEL 1-5: Performed by: INTERNAL MEDICINE

## 2019-04-12 PROCEDURE — C9113 INJ PANTOPRAZOLE SODIUM, VIA: HCPCS | Performed by: HOSPITALIST

## 2019-04-12 PROCEDURE — 85018 HEMOGLOBIN: CPT | Mod: 91

## 2019-04-12 PROCEDURE — 700105 HCHG RX REV CODE 258: Performed by: HOSPITALIST

## 2019-04-12 PROCEDURE — 93010 ELECTROCARDIOGRAM REPORT: CPT | Performed by: INTERNAL MEDICINE

## 2019-04-12 PROCEDURE — 770020 HCHG ROOM/CARE - TELE (206)

## 2019-04-12 PROCEDURE — 160009 HCHG ANES TIME/MIN: Performed by: INTERNAL MEDICINE

## 2019-04-12 PROCEDURE — 700101 HCHG RX REV CODE 250

## 2019-04-12 PROCEDURE — A9270 NON-COVERED ITEM OR SERVICE: HCPCS | Performed by: HOSPITALIST

## 2019-04-12 PROCEDURE — 160002 HCHG RECOVERY MINUTES (STAT): Performed by: INTERNAL MEDICINE

## 2019-04-12 PROCEDURE — 700102 HCHG RX REV CODE 250 W/ 637 OVERRIDE(OP): Performed by: HOSPITALIST

## 2019-04-12 PROCEDURE — 36415 COLL VENOUS BLD VENIPUNCTURE: CPT

## 2019-04-12 PROCEDURE — 302135 SEQUENTIAL COMPRESSION MACHINE: Performed by: INTERNAL MEDICINE

## 2019-04-12 PROCEDURE — 0DJ08ZZ INSPECTION OF UPPER INTESTINAL TRACT, VIA NATURAL OR ARTIFICIAL OPENING ENDOSCOPIC: ICD-10-PCS | Performed by: INTERNAL MEDICINE

## 2019-04-12 PROCEDURE — 700105 HCHG RX REV CODE 258

## 2019-04-12 RX ORDER — ONDANSETRON 2 MG/ML
4 INJECTION INTRAMUSCULAR; INTRAVENOUS
Status: DISCONTINUED | OUTPATIENT
Start: 2019-04-12 | End: 2019-04-12 | Stop reason: HOSPADM

## 2019-04-12 RX ORDER — OMEPRAZOLE 20 MG/1
20 CAPSULE, DELAYED RELEASE ORAL DAILY
Status: DISCONTINUED | OUTPATIENT
Start: 2019-04-13 | End: 2019-04-13

## 2019-04-12 RX ORDER — DIPHENHYDRAMINE HYDROCHLORIDE 50 MG/ML
12.5 INJECTION INTRAMUSCULAR; INTRAVENOUS
Status: DISCONTINUED | OUTPATIENT
Start: 2019-04-12 | End: 2019-04-12 | Stop reason: HOSPADM

## 2019-04-12 RX ORDER — SODIUM CHLORIDE, SODIUM LACTATE, POTASSIUM CHLORIDE, CALCIUM CHLORIDE 600; 310; 30; 20 MG/100ML; MG/100ML; MG/100ML; MG/100ML
INJECTION, SOLUTION INTRAVENOUS
Status: DISCONTINUED | OUTPATIENT
Start: 2019-04-12 | End: 2019-04-12 | Stop reason: SURG

## 2019-04-12 RX ADMIN — PROPOFOL 40 MG: 10 INJECTION, EMULSION INTRAVENOUS at 14:25

## 2019-04-12 RX ADMIN — SODIUM CHLORIDE, POTASSIUM CHLORIDE, SODIUM LACTATE AND CALCIUM CHLORIDE: 600; 310; 30; 20 INJECTION, SOLUTION INTRAVENOUS at 14:19

## 2019-04-12 RX ADMIN — AMIODARONE HYDROCHLORIDE 100 MG: 200 TABLET ORAL at 05:06

## 2019-04-12 RX ADMIN — PROPOFOL 40 MG: 10 INJECTION, EMULSION INTRAVENOUS at 14:21

## 2019-04-12 RX ADMIN — SODIUM CHLORIDE: 9 INJECTION, SOLUTION INTRAVENOUS at 16:18

## 2019-04-12 RX ADMIN — SODIUM CHLORIDE 8 MG/HR: 9 INJECTION, SOLUTION INTRAVENOUS at 09:03

## 2019-04-12 RX ADMIN — LEVOTHYROXINE SODIUM 50 MCG: 50 TABLET ORAL at 05:05

## 2019-04-12 ASSESSMENT — ENCOUNTER SYMPTOMS
ABDOMINAL PAIN: 0
NAUSEA: 0
VOMITING: 0
DIZZINESS: 0
DIARRHEA: 0
SHORTNESS OF BREATH: 0

## 2019-04-12 NOTE — PROGRESS NOTES
· 2 RN skin check complete with JUJU Samano.  · Devices in place Silicone oxygen tubing.  · Skin assessed under devices Oxygen tubing, no open areas.  · Confirmed pressure ulcers found on N/A.  · New potential pressure ulcers noted on N/A. Wound consult placed and wound reported.  · The following interventions in place N/A.    No signs of skin breakdown noted.

## 2019-04-12 NOTE — PROCEDURES
DATE OF SERVICE:  04/12/2019    PROCEDURE:  Esophagogastroduodenoscopy.    PREOPERATIVE DIAGNOSES:  Gastrointestinal bleed, melena.    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    ANESTHESIA:  Per anesthesiologist in the operating room.    DESCRIPTION OF PROCEDURE:  After informed consent and appropriate sedation,   the patient was placed in the left lateral position.  The gastroscope was   advanced through the oropharynx into the esophagus through to the second   portion of duodenum.  The duodenum was unremarkable.  The scope was then   gently withdrawn back into the stomach.  The gastric antrum showed 2 small   clean based ulcers and the remainder of the body of stomach was unremarkable.    On retroflexion, the gastric cardia and fundus were unremarkable other than   the presence of a 2 cm hiatal hernia.  GE junction showed 2 areas of possible   Junior's esophagus, [place] criteria C0M1.  These were not biopsied because   patient is currently in the hospital with GI bleed.  The remainder of the   esophagus was unremarkable.  The scope was withdrawn.  There were no immediate   postop complications.    RECOMMENDATIONS:  1.  We will discuss with the patient when he wakes up as to whether he wants   to do a colonoscopy.  Initially, the patient was planned to have an EGD and   colonoscopy today; however, patient did not have a colon prep ordered   overnight apparently so this was not possible.  If the patient is   hemodynamically stable, could consider an outpatient colonoscopy if the   patient would like that.  2.  Clear liquid diet okay for today.  We will await further diet orders until   the patient has decided whether he would like to do an inpatient or   outpatient colonoscopy.  3.  Recommend GERD precautions including elevating head of bed at night, and   do not eat or drink for 2-3 hours before bed.  Recommend PPI once daily and   avoid caffeine, tobacco,  alcohol, citrus fruits, tomato sauces, peppermint,   chocolate and carbonated drinks.  Consider repeat EGD as an outpatient with   biopsies to evaluate for possible Junior's esophagus once the patient no   longer has a problem with GI bleeding.  4.  Follow up as an outpatient with consulting gastroenterologist.  5.  Please call with any questions or concerns.       ____________________________________     DO ALVARO Escamilla / ANTONIO    DD:  04/12/2019 14:36:01  DT:  04/12/2019 14:50:45    D#:  [id number]  Job#:  [id number]

## 2019-04-12 NOTE — ANESTHESIA PREPROCEDURE EVALUATION
Relevant Problems   (+) Acquired hypothyroidism   (+) Hypertension   (+) Paroxysmal atrial fibrillation (HCC)       Physical Exam    Airway   Mallampati: II  TM distance: >3 FB  Neck ROM: full       Cardiovascular - normal exam  Rhythm: regular  Rate: normal  (-) murmur     Dental - normal exam           Pulmonary - normal exam  Breath sounds clear to auscultation     Abdominal    Neurological - normal exam                 Anesthesia Plan    ASA 3   ASA physical status 3 criteria: CAD/stents (> 3 months)    Plan - general and MAC             Induction: intravenous    Postoperative Plan: Postoperative administration of opioids is intended.    Pertinent diagnostic labs and testing reviewed    Informed Consent:    Anesthetic plan and risks discussed with patient.    Use of blood products discussed with: patient whom consented to blood products.

## 2019-04-12 NOTE — PROGRESS NOTES
Assumed care of PT A&O 4. Pt resting in bed with no signs of labored breathing. On 1L NC. Tele monitor in place, cardiac rhythm being monitored. Call light within reach, bed in lowest position, upper bed rails up. Pt was updated on plan of care for the day . Will continue to monitor.

## 2019-04-12 NOTE — PROGRESS NOTES
Dr Staley paged regarding pt wishes to speak with her. MD stated she will speak to pt during rounds. This RN given orders by MD to not place an NG tube and aspirate for residuals.

## 2019-04-12 NOTE — DISCHARGE PLANNING
Pt lives in 1 story home with spouse Kristi ([phone]) in [place], [place]. Pt uses a cane at times for support and reports a receiving knee replacement recently (march 2019). Pt has worked with therapy since knee replacement. No anticipated needs at this time.     Care Transition Team Assessment  Readmission Evaluation  Is this a readmission?: No    Elopement Risk  Legal Hold: No  Ambulatory or Self Mobile in Wheelchair: No-Not an Elopement Risk         Discharge Preparedness  What is your plan after discharge?: Home with help  What are your discharge supports?: Spouse  Prior Functional Level: Independent with Activities of Daily Living, Uses Cane    Functional Assesment  Prior Functional Level: Independent with Activities of Daily Living, Uses Cane    Finances  Financial Barriers to Discharge: No  Prescription Coverage: Yes                   Domestic Abuse  Have you ever been the victim of abuse or violence?: No    Psychological Assessment  History of Substance Abuse: None  History of Psychiatric Problems: No         Anticipated Discharge Information  Anticipated discharge disposition: Home  Discharge Address:  ([address] [place] [address])  Discharge Contact Phone Number:  ([phone])

## 2019-04-12 NOTE — PROGRESS NOTES
Updated pt on POC. Pt refusing all procedures until procedures discussed with MD. Dr Luís rodriguez.

## 2019-04-12 NOTE — CARE PLAN
Problem: Safety  Goal: Will remain free from falls  Outcome: PROGRESSING AS EXPECTED  Pt mobility assessed at beginning of shift. Pt is standby assist. Fall precautions in place. Non-slip socks on. Bed in lowest locked position. Bed alarm on. Call light within reach. Pt educated to call for assistance and verbalizes understanding.      Problem: Knowledge Deficit  Goal: Knowledge of disease process/condition, treatment plan, diagnostic tests, and medications will improve  Outcome: PROGRESSING AS EXPECTED  Pt educated about disease process. Reason why medications are taken. And informed about treatment plan. MD paged to bedside. All procedures explained.

## 2019-04-12 NOTE — PROGRESS NOTES
Day shift JUJU Santos paged Dr. Greenfield asking about a diet order for pt since pt was NPO for potential EGD/colonoscopy in the AM    Asked for a diet order so pt could eat, and Dr. Greenfield said to follow whatever  put in for a diet order.    Per pt, has not had anything to eat since 4/10.

## 2019-04-12 NOTE — OR NURSING
Patient A+Ox3. Denies pain or nausea.  Dr Mendoza at bedside and stated patient appropriate to discharge back to t707-2 when transport available.  VSs.  No distress. Patient states he doesnt think family is here for update.

## 2019-04-12 NOTE — ANESTHESIA TIME REPORT
Anesthesia Start and Stop Event Times     Date Time Event    4/12/2019 1419 Anesthesia Start     1440 Anesthesia Stop        Responsible Staff  04/12/19    Name Role Begin End    Natan Mendoza M.D. Anesth 1419 1440        Preop Diagnosis (Free Text):  Pre-op Diagnosis     Hematochezia        Preop Diagnosis (Codes):  Diagnosis Information     Diagnosis Code(s):         Post op Diagnosis  GI bleed      Premium Reason  Non-Premium    Comments:

## 2019-04-12 NOTE — CARE PLAN
Problem: Safety  Goal: Will remain free from injury  Outcome: PROGRESSING AS EXPECTED  Pt calls appropriately, call light within reach. Bed in lowest and locked position, with hourly rounding in place. Bed alarm on and treaded slipper socks on. Mobility assessed with appropriate signs in place. Hourly rounding in place.      Intervention: Provide assistance with mobility   04/11/19 2000   OTHER   Assistance / Tolerance Standby Assist         Problem: Infection  Goal: Will remain free from infection  Outcome: PROGRESSING AS EXPECTED  Standard precautions in place. Hand hygiene completed before entering room and exiting room.

## 2019-04-12 NOTE — ANESTHESIA POSTPROCEDURE EVALUATION
Patient: Rc Auguste    Procedure Summary     Date:  04/12/19 Room / Location:  [facility] OR 04 / SURGERY [facility] ORS    Anesthesia Start:  1419 Anesthesia Stop:  1440    Procedure:  GASTROSCOPY (N/A Esophagus) Diagnosis:  (peptic  ulcer, hiatal hernia, varicies)    Surgeon:  Lj Darby D.O. Responsible Provider:  Natan Mendoza M.D.    Anesthesia Type:  general, MAC ASA Status:  3          Final Anesthesia Type: general, MAC  Last vitals  BP   Blood Pressure : 142/83, NIBP: 108/66    Temp   36.7 °C (98 °F)    Pulse   Pulse: 67, Heart Rate (Monitored): 74   Resp   16    SpO2   100 %      Anesthesia Post Evaluation    Patient location during evaluation: PACU  Patient participation: complete - patient participated  Level of consciousness: awake and alert    Airway patency: patent  Anesthetic complications: no  Cardiovascular status: hemodynamically stable  Respiratory status: acceptable  Hydration status: euvolemic    PONV: none           Nurse Pain Score: 0 (NPRS)

## 2019-04-12 NOTE — PROGRESS NOTES
Hospital Medicine Daily Progress Note    Date of Service  4/12/2019    Chief Complaint  87 y.o. male admitted 4/11/2019 with GIB.    Hospital Course    PH pafib not on anticoagulation who recently had knee surgery and constipation who presented with dizziness and hematochezia. Hgb was down from 11 to 7.6 and given blood transfusion. Dr. Be with GI was consulted.        Interval Problem Update  Hgb remains low despite 1U PRBCs. Has not had BM  He says his dizziness is gone  Plan for scope today with GI    Consultants/Specialty  GI    Code Status  Full    Disposition  Pending    Review of Systems  Review of Systems   Constitutional: Negative for malaise/fatigue.   HENT: Negative for congestion.    Respiratory: Negative for shortness of breath.    Cardiovascular: Negative for chest pain.   Gastrointestinal: Negative for abdominal pain, diarrhea, nausea and vomiting.   Skin: Negative for itching.   Neurological: Negative for dizziness.        Physical Exam  Temp:  [36.1 °C (97 °F)-36.8 °C (98.3 °F)] 36.3 °C (97.3 °F)  Pulse:  [56-82] 72  Resp:  [13-26] 18  BP: (107-139)/(58-83) 113/60  SpO2:  [90 %-96 %] 96 %    Physical Exam   Constitutional: He appears well-developed and well-nourished.   HENT:   Head: Normocephalic.   Mouth/Throat: Oropharynx is clear and moist.   Eyes: Conjunctivae are normal. Right eye exhibits no discharge. Left eye exhibits no discharge.   Cardiovascular: Normal rate and regular rhythm.    Pulmonary/Chest: Effort normal. He has no wheezes.   Abdominal: Soft. There is no tenderness. There is no rebound and no guarding.   Musculoskeletal: He exhibits no edema.   Left knee with healing surgical wound, swelling, nontender   Neurological: He is alert.   Skin: Skin is warm and dry. There is pallor.   Nursing note and vitals reviewed.      Fluids    Intake/Output Summary (Last 24 hours) at 04/12/19 1255  Last data filed at 04/12/19 0400   Gross per 24 hour   Intake           272.92 ml   Output               800 ml   Net          -527.08 ml       Laboratory  Recent Labs      04/11/19   1310  04/11/19   2230  04/12/19   0429  04/12/19   1033   WBC  7.8   --    --    --    RBC  2.43*   --    --    --    HEMOGLOBIN  7.9*  7.6*  7.6*  8.2*   HEMATOCRIT  25.5*   --    --    --    MCV  104.9*   --    --    --    MCH  32.5   --    --    --    MCHC  31.0*   --    --    --    RDW  56.3*   --    --    --    PLATELETCT  282   --    --    --    MPV  10.3   --    --    --      Recent Labs      04/11/19   1310   SODIUM  137   POTASSIUM  4.6   CHLORIDE  109   CO2  22   GLUCOSE  92   BUN  40*   CREATININE  1.34   CALCIUM  9.1     Recent Labs      04/11/19   1355   APTT  33.1   INR  1.13               Imaging  DX-CHEST-PORTABLE (1 VIEW)   Final Result      1.  There is no acute cardiopulmonary process.           Assessment/Plan  * GI bleed- (present on admission)   Assessment & Plan    This is most consistent with an upper GI bleed given his elevated BUN though this time patient's story of the color of his stools could be lower as well.  An NG will be placed in the emergency room to see if there is blood from the stomach  He had been on Celebrex and aspirin  IV Protonix drip has been ordered  Serial hemoglobins every 6 hours with orders to transfuse for hemoglobin less than 7  Gastroenterology has been consulted for EGD and possible colonoscopy today     Anemia due to acute blood loss- (present on admission)   Assessment & Plan    Given 1 unit PRBC  Monitor hemoglobin     Paroxysmal atrial fibrillation (HCC)- (present on admission)   Assessment & Plan    History of atrial fibrillation for which is on amiodarone and not on anticoagulation  Monitor on telemetry     Acquired hypothyroidism- (present on admission)   Assessment & Plan    Continue Synthroid     Status post left knee replacement- (present on admission)   Assessment & Plan    By Dr. Mary on 3/20/2019  Recovering well          VTE prophylaxis: scds, GIB

## 2019-04-12 NOTE — PROGRESS NOTES
Pt transferred to tele per flex Selwyn MATUTE. Report received. Pt alert and oriented x4, denies pain or distress at this time. Empty blood bag noted on IV pole, previously stopped by ER RN. VSS. Pt gait weak, unsteady. Fall precautions in place. Pt verbalized understanding.

## 2019-04-12 NOTE — ANESTHESIA QCDR
2019 Qualified Clinical Data Registry (for Quality Improvement)     Postoperative nausea/vomiting risk protocol (Adult = 18 yrs and Pediatric 3-17 yrs)- (430 and 463)  General inhalation anesthetic (NOT TIVA) with PONV risk factors: Yes  Provision of anti-emetic therapy with at least 2 different classes of agents: No   Patient DID NOT receive anti-emetic therapy and reason is documented in Medical Record:        Multimodal Pain Management- (AQI59)  Patient undergoing Elective Surgery (i.e. Outpatient, or ASC, or Prescheduled Surgery prior to Hospital Admission): No  Use of Multimodal Pain Management, two or more drugs and/or interventions, NOT including systemic opioids: N/A  Exception: Documented allergy to multiple classes of analgesics: N/A    PACU assessment of acute postoperative pain prior to Anesthesia Care End- Applies to Patients Age = 18- (ABG7)  Initial PACU pain score is which of the following: < 7/10  Patient unable to report pain score: N/A    Post-anesthetic transfer of care checklist/protocol to PACU/ICU- (426 and 427)  Upon conclusion of case, patient transferred to which of the following locations: PACU/Non-ICU  Use of transfer checklist/protocol: Yes  Exclusion: Service Performed in Patient Hospital Room (and thus did not require transfer): N/A    PACU Reintubation- (AQI31)  General anesthesia requiring endotracheal intubation (ETT) along with subsequent extubation in OR or PACU: No  Required reintubation in the PACU: N/A  Extubation was a planned trial documented in the medical record prior to removal of the original airway device: N/A    Unplanned admission to ICU related to anesthesia service up through end of PACU care- (MD51)  Unplanned admission to ICU (not initially anticipated at anesthesia start time): No

## 2019-04-13 LAB
ANION GAP SERPL CALC-SCNC: 8 MMOL/L (ref 0–11.9)
BASOPHILS # BLD AUTO: 0.6 % (ref 0–1.8)
BASOPHILS # BLD: 0.03 K/UL (ref 0–0.12)
BUN SERPL-MCNC: 25 MG/DL (ref 8–22)
CALCIUM SERPL-MCNC: 8 MG/DL (ref 8.5–10.5)
CHLORIDE SERPL-SCNC: 115 MMOL/L (ref 96–112)
CO2 SERPL-SCNC: 21 MMOL/L (ref 20–33)
CREAT SERPL-MCNC: 1.17 MG/DL (ref 0.5–1.4)
EOSINOPHIL # BLD AUTO: 0.15 K/UL (ref 0–0.51)
EOSINOPHIL NFR BLD: 2.9 % (ref 0–6.9)
ERYTHROCYTE [DISTWIDTH] IN BLOOD BY AUTOMATED COUNT: 55 FL (ref 35.9–50)
GLUCOSE SERPL-MCNC: 72 MG/DL (ref 65–99)
HCT VFR BLD AUTO: 26.6 % (ref 42–52)
HGB BLD-MCNC: 7.4 G/DL (ref 14–18)
HGB BLD-MCNC: 7.7 G/DL (ref 14–18)
HGB BLD-MCNC: 8.6 G/DL (ref 14–18)
IMM GRANULOCYTES # BLD AUTO: 0.06 K/UL (ref 0–0.11)
IMM GRANULOCYTES NFR BLD AUTO: 1.1 % (ref 0–0.9)
LYMPHOCYTES # BLD AUTO: 1.13 K/UL (ref 1–4.8)
LYMPHOCYTES NFR BLD: 21.6 % (ref 22–41)
MCH RBC QN AUTO: 32.6 PG (ref 27–33)
MCHC RBC AUTO-ENTMCNC: 32.3 G/DL (ref 33.7–35.3)
MCV RBC AUTO: 100.8 FL (ref 81.4–97.8)
MONOCYTES # BLD AUTO: 0.58 K/UL (ref 0–0.85)
MONOCYTES NFR BLD AUTO: 11.1 % (ref 0–13.4)
MORPHOLOGY BLD-IMP: NORMAL
NEUTROPHILS # BLD AUTO: 3.27 K/UL (ref 1.82–7.42)
NEUTROPHILS NFR BLD: 62.7 % (ref 44–72)
NRBC # BLD AUTO: 0 K/UL
NRBC BLD-RTO: 0 /100 WBC
PLATELET # BLD AUTO: 242 K/UL (ref 164–446)
PMV BLD AUTO: 9.8 FL (ref 9–12.9)
POTASSIUM SERPL-SCNC: 3.8 MMOL/L (ref 3.6–5.5)
RBC # BLD AUTO: 2.64 M/UL (ref 4.7–6.1)
SODIUM SERPL-SCNC: 144 MMOL/L (ref 135–145)
WBC # BLD AUTO: 5.2 K/UL (ref 4.8–10.8)

## 2019-04-13 PROCEDURE — 80048 BASIC METABOLIC PNL TOTAL CA: CPT

## 2019-04-13 PROCEDURE — 85025 COMPLETE CBC W/AUTO DIFF WBC: CPT

## 2019-04-13 PROCEDURE — 85018 HEMOGLOBIN: CPT

## 2019-04-13 PROCEDURE — 700102 HCHG RX REV CODE 250 W/ 637 OVERRIDE(OP): Performed by: INTERNAL MEDICINE

## 2019-04-13 PROCEDURE — 700105 HCHG RX REV CODE 258: Performed by: INTERNAL MEDICINE

## 2019-04-13 PROCEDURE — A9270 NON-COVERED ITEM OR SERVICE: HCPCS | Performed by: INTERNAL MEDICINE

## 2019-04-13 PROCEDURE — 700102 HCHG RX REV CODE 250 W/ 637 OVERRIDE(OP): Performed by: HOSPITALIST

## 2019-04-13 PROCEDURE — 770020 HCHG ROOM/CARE - TELE (206)

## 2019-04-13 PROCEDURE — A9270 NON-COVERED ITEM OR SERVICE: HCPCS | Performed by: HOSPITALIST

## 2019-04-13 PROCEDURE — 99233 SBSQ HOSP IP/OBS HIGH 50: CPT | Performed by: INTERNAL MEDICINE

## 2019-04-13 PROCEDURE — 700101 HCHG RX REV CODE 250: Performed by: INTERNAL MEDICINE

## 2019-04-13 PROCEDURE — 36415 COLL VENOUS BLD VENIPUNCTURE: CPT

## 2019-04-13 RX ORDER — OMEPRAZOLE 20 MG/1
20 CAPSULE, DELAYED RELEASE ORAL 2 TIMES DAILY
Status: DISCONTINUED | OUTPATIENT
Start: 2019-04-13 | End: 2019-04-15 | Stop reason: HOSPADM

## 2019-04-13 RX ORDER — SODIUM CHLORIDE, SODIUM LACTATE, POTASSIUM CHLORIDE, CALCIUM CHLORIDE 600; 310; 30; 20 MG/100ML; MG/100ML; MG/100ML; MG/100ML
INJECTION, SOLUTION INTRAVENOUS CONTINUOUS
Status: DISCONTINUED | OUTPATIENT
Start: 2019-04-13 | End: 2019-04-14

## 2019-04-13 RX ADMIN — AMIODARONE HYDROCHLORIDE 100 MG: 200 TABLET ORAL at 05:56

## 2019-04-13 RX ADMIN — SODIUM CHLORIDE, POTASSIUM CHLORIDE, SODIUM LACTATE AND CALCIUM CHLORIDE: 600; 310; 30; 20 INJECTION, SOLUTION INTRAVENOUS at 23:21

## 2019-04-13 RX ADMIN — OMEPRAZOLE 20 MG: 20 CAPSULE, DELAYED RELEASE ORAL at 05:56

## 2019-04-13 RX ADMIN — POLYETHYLENE GLYCOL 3350, SODIUM SULFATE ANHYDROUS, SODIUM BICARBONATE, SODIUM CHLORIDE, POTASSIUM CHLORIDE 4 L: 236; 22.74; 6.74; 5.86; 2.97 POWDER, FOR SOLUTION ORAL at 17:46

## 2019-04-13 RX ADMIN — LEVOTHYROXINE SODIUM 50 MCG: 50 TABLET ORAL at 05:56

## 2019-04-13 RX ADMIN — OMEPRAZOLE 20 MG: 20 CAPSULE, DELAYED RELEASE ORAL at 17:46

## 2019-04-13 ASSESSMENT — ENCOUNTER SYMPTOMS
NAUSEA: 0
SHORTNESS OF BREATH: 0
DIARRHEA: 0
ABDOMINAL PAIN: 0
VOMITING: 0
DIZZINESS: 0

## 2019-04-13 NOTE — PROGRESS NOTES
Received report from night shift RNCherelle. Discussed plan of care, updated white board. Pt resting comfortably in bed, no distress noted at this time. Respirations even and unlabored. Fall precautions in place. All needs met. Bed in lowest position. Call light within reach. Will continue to monitor.

## 2019-04-13 NOTE — CARE PLAN
Problem: Safety  Goal: Will remain free from injury  Outcome: PROGRESSING AS EXPECTED  Pt calls appropriately, call light within reach. Bed in lowest and locked position, with hourly rounding in place. Bed alarm on and treaded slipper socks on. Mobility assessed with appropriate signs in place. Hourly rounding in place.      Intervention: Provide assistance with mobility   04/12/19 2000   OTHER   Assistance / Tolerance Standby Assist         Problem: Infection  Goal: Will remain free from infection  Outcome: PROGRESSING AS EXPECTED  Standard precautions in place. Hand hygiene completed before entering room and exiting room.

## 2019-04-13 NOTE — PROGRESS NOTES
[facility] Medicine Daily Progress Note    Date of Service  4/13/2019    Chief Complaint  87 y.o. male admitted 4/11/2019 with GIB.    Hospital Course    PH pafib not on anticoagulation who recently had knee surgery and constipation who presented with dizziness and hematochezia. Hgb was down from 11 to 7.6 and given blood transfusion. Dr. Be with GI was consulted.        Interval Problem Update  Hgb remains ~7  Creat decreased  Patient says he had a dark stool this morning, says it looked like his prior stools.  He denies dizziness  He says he would like to do the colonoscopy, GI is aware, will try for tomorrow    Consultants/Specialty  GI    Code Status  Full    Disposition  Colonoscopy tomorrow    Review of Systems  Review of Systems   Constitutional: Negative for malaise/fatigue.   Respiratory: Negative for shortness of breath.    Cardiovascular: Negative for chest pain.   Gastrointestinal: Positive for melena. Negative for abdominal pain, diarrhea, nausea and vomiting.   Skin: Negative for itching.   Neurological: Negative for dizziness.        Physical Exam  Temp:  [36.4 °C (97.5 °F)-37.1 °C (98.7 °F)] 37.1 °C (98.7 °F)  Pulse:  [63-89] 68  Resp:  [10-22] 17  BP: (113-150)/(66-85) 122/66  SpO2:  [93 %-100 %] 100 %    Physical Exam   Constitutional: He is oriented to person, place, and time. He appears well-developed and well-nourished.   HENT:   Head: Normocephalic.   Mouth/Throat: Oropharynx is clear and moist.   Eyes: Conjunctivae are normal. Right eye exhibits no discharge. Left eye exhibits no discharge.   Cardiovascular: Normal rate and regular rhythm.    Pulmonary/Chest: Effort normal. He has no wheezes.   Abdominal: Soft. There is no tenderness. There is no rebound and no guarding.   Musculoskeletal: He exhibits no edema.   Left knee with healing surgical wound, swelling, nontender   Neurological: He is alert and oriented to person, place, and time.   Skin: Skin is warm and dry. There is pallor.    Nursing note and vitals reviewed.      Fluids    Intake/Output Summary (Last 24 hours) at 04/13/19 1322  Last data filed at 04/13/19 0900   Gross per 24 hour   Intake             1460 ml   Output              200 ml   Net             1260 ml       Laboratory  Recent Labs      04/11/19   1310   04/12/19   1753  04/13/19   0012  04/13/19   0549   WBC  7.8   --    --    --    --    RBC  2.43*   --    --    --    --    HEMOGLOBIN  7.9*   < >  8.5*  7.4*  7.7*   HEMATOCRIT  25.5*   --    --    --    --    MCV  104.9*   --    --    --    --    MCH  32.5   --    --    --    --    MCHC  31.0*   --    --    --    --    RDW  56.3*   --    --    --    --    PLATELETCT  282   --    --    --    --    MPV  10.3   --    --    --    --     < > = values in this interval not displayed.     Recent Labs      04/11/19   1310  04/13/19   0012   SODIUM  137  144   POTASSIUM  4.6  3.8   CHLORIDE  109  115*   CO2  22  21   GLUCOSE  92  72   BUN  40*  25*   CREATININE  1.34  1.17   CALCIUM  9.1  8.0*     Recent Labs      04/11/19   1355   APTT  33.1   INR  1.13               Imaging  DX-CHEST-PORTABLE (1 VIEW)   Final Result      1.  There is no acute cardiopulmonary process.           Assessment/Plan  * GI bleed- (present on admission)   Assessment & Plan    This is most consistent with an upper GI bleed given his elevated BUN though this time patient's story of the color of his stools could be lower as well.  He had been on Celebrex and aspirin  GI consulted, EGD showed Junior's esophagus and PUD.  Continue on BID omeprazole  Plan for colonoscopy tomorrow.  N.p.o. after midnight and GoLYTELY it has been ordered  Monitor hemoglobin     Anemia due to acute blood loss- (present on admission)   Assessment & Plan    Given 1 unit PRBC  Monitor hemoglobin, transfuse if less than 7     Paroxysmal atrial fibrillation (HCC)- (present on admission)   Assessment & Plan    History of atrial fibrillation for which is on amiodarone and not on  anticoagulation  Monitor on telemetry     Acquired hypothyroidism- (present on admission)   Assessment & Plan    Continue Synthroid     Status post left knee replacement- (present on admission)   Assessment & Plan    By Dr. Mary on 3/20/2019  Recovering well          VTE prophylaxis: scds, GIB

## 2019-04-13 NOTE — PROGRESS NOTES
Bedside report received from day shift RN, assumed pt care. Pt assessment complete. Pt is alert, pt was a little confused as to why he was in the hospital, and forgot he was in the hospital. Reviewed plan of care with pt. Tele box on and rhythm verified.  Chart and labs reviewed.  Bed in lowest position, call light within reach. Hourly rounding in place. Educated about calling for assistance.

## 2019-04-13 NOTE — PROGRESS NOTES
Gastroenterology Progress Note     Author: Lj Darby   Date & Time Created: 4/13/2019 1:02 PM    Chief Complaint:  Gi bleed    Interval History:  Required transfusions and had bloody stools yesterday. No BM's today. Denies fever, chills, chest pain, SOB, abdominal pain, N/V/D. Pt feels well today.     Review of Systems:  ROS    Physical Exam:  Physical Exam   Constitutional: He is oriented to person, place, and time. He appears well-developed and well-nourished.   HENT:   Head: Normocephalic and atraumatic.   +conjunctival pallor   Eyes: Pupils are equal, round, and reactive to light. No scleral icterus.   Neck: Normal range of motion. Neck supple.   Cardiovascular: Normal rate and regular rhythm.    Pulmonary/Chest: Effort normal.   Abdominal: Soft. He exhibits no distension. There is no tenderness. There is no rebound and no guarding.   Musculoskeletal: Normal range of motion.   Neurological: He is alert and oriented to person, place, and time.   Skin: Skin is warm and dry.   Psychiatric: He has a normal mood and affect. His behavior is normal.       Labs:      Recent Labs      04/11/19   1310   TROPONINI  <0.01     Recent Labs      04/11/19   1310  04/13/19   0012   SODIUM  137  144   POTASSIUM  4.6  3.8   CHLORIDE  109  115*   CO2  22  21   BUN  40*  25*   CREATININE  1.34  1.17   CALCIUM  9.1  8.0*     Recent Labs      04/11/19   1310  04/13/19   0012   ALTSGPT  5   --    ASTSGOT  16   --    ALKPHOSPHAT  71   --    TBILIRUBIN  0.4   --    GLUCOSE  92  72     Recent Labs      04/11/19   1310  04/11/19   1355   04/12/19   1753  04/13/19   0012  04/13/19   0549   RBC  2.43*   --    --    --    --    --    HEMOGLOBIN  7.9*   --    < >  8.5*  7.4*  7.7*   HEMATOCRIT  25.5*   --    --    --    --    --    PLATELETCT  282   --    --    --    --    --    PROTHROMBTM   --   14.6   --    --    --    --    APTT   --   33.1   --    --    --    --    INR   --   1.13   --    --    --    --     < > = values in this  interval not displayed.     Recent Labs      19   1310   WBC  7.8   NEUTSPOLYS  77.20*   LYMPHOCYTES  12.80*   MONOCYTES  6.90   EOSINOPHILS  1.40   BASOPHILS  0.80   ASTSGOT  16   ALTSGPT  5   ALKPHOSPHAT  71   TBILIRUBIN  0.4     Hemodynamics:  Temp (24hrs), Av.6 °C (97.9 °F), Min:36.4 °C (97.5 °F), Max:37.1 °C (98.7 °F)  Temperature: 37.1 °C (98.7 °F)  Pulse  Av.3  Min: 56  Max: 89Heart Rate (Monitored): 73  Blood Pressure : 122/66, NIBP: 122/67     Respiratory:    Respiration: 17, Pulse Oximetry: 100 %        RUL Breath Sounds: Clear, RML Breath Sounds: Diminished, RLL Breath Sounds: Diminished, MARTIN Breath Sounds: Clear, LLL Breath Sounds: Diminished  Fluids:    Intake/Output Summary (Last 24 hours) at 19 1302  Last data filed at 19 0900   Gross per 24 hour   Intake             1460 ml   Output              200 ml   Net             1260 ml        GI/Nutrition:  Orders Placed This Encounter   Procedures   • Diet Order Clear Liquids - No Red Foods     Standing Status:   Standing     Number of Occurrences:   1     Order Specific Question:   Diet:     Answer:   Clear Liquids - No Red Foods [12]   • Diet NPO     Standing Status:   Standing     Number of Occurrences:   8     Order Specific Question:   Restrict to:     Answer:   Sips with Medications [3]     Medical Decision Making, by Problem:  Active Hospital Problems    Diagnosis   • *GI bleed [K92.2]   • Anemia due to acute blood loss [D62]   • Acquired hypothyroidism [E03.9]   • Paroxysmal atrial fibrillation (HCC) [I48.0]   • Status post left knee replacement [Z96.652]       Plan:  1. Gi bleed - plan for colonoscopy tomorrow to evaluate for source of hematochezia. Transfuse PRN Hb <7. Clear liquid diet, NPO after midnight, golytely this evening.   2.  Anemia -see above.   3.  Possible castro's - biopsy as outpatient in 6-8 weeks at repeat EGD  4.  PUD - PPI BID, avoid NSAIDs.   5.  Will make further recs based on colonoscopy.      Quality-Core Measures

## 2019-04-14 ENCOUNTER — ANESTHESIA EVENT (OUTPATIENT)
Dept: SURGERY | Facility: MEDICAL CENTER | Age: 84
DRG: 384 | End: 2019-04-14
Payer: COMMERCIAL

## 2019-04-14 ENCOUNTER — ANESTHESIA (OUTPATIENT)
Dept: SURGERY | Facility: MEDICAL CENTER | Age: 84
DRG: 384 | End: 2019-04-14
Payer: COMMERCIAL

## 2019-04-14 LAB
ANION GAP SERPL CALC-SCNC: 6 MMOL/L (ref 0–11.9)
BASOPHILS # BLD AUTO: 0.4 % (ref 0–1.8)
BASOPHILS # BLD: 0.02 K/UL (ref 0–0.12)
BUN SERPL-MCNC: 16 MG/DL (ref 8–22)
CALCIUM SERPL-MCNC: 8.3 MG/DL (ref 8.5–10.5)
CHLORIDE SERPL-SCNC: 112 MMOL/L (ref 96–112)
CO2 SERPL-SCNC: 22 MMOL/L (ref 20–33)
CREAT SERPL-MCNC: 1.08 MG/DL (ref 0.5–1.4)
EOSINOPHIL # BLD AUTO: 0.19 K/UL (ref 0–0.51)
EOSINOPHIL NFR BLD: 4.1 % (ref 0–6.9)
ERYTHROCYTE [DISTWIDTH] IN BLOOD BY AUTOMATED COUNT: 54.2 FL (ref 35.9–50)
GLUCOSE SERPL-MCNC: 87 MG/DL (ref 65–99)
HCT VFR BLD AUTO: 23 % (ref 42–52)
HGB BLD-MCNC: 7.5 G/DL (ref 14–18)
IMM GRANULOCYTES # BLD AUTO: 0.04 K/UL (ref 0–0.11)
IMM GRANULOCYTES NFR BLD AUTO: 0.9 % (ref 0–0.9)
LYMPHOCYTES # BLD AUTO: 1.02 K/UL (ref 1–4.8)
LYMPHOCYTES NFR BLD: 22.3 % (ref 22–41)
MCH RBC QN AUTO: 32.5 PG (ref 27–33)
MCHC RBC AUTO-ENTMCNC: 32.6 G/DL (ref 33.7–35.3)
MCV RBC AUTO: 99.6 FL (ref 81.4–97.8)
MONOCYTES # BLD AUTO: 0.48 K/UL (ref 0–0.85)
MONOCYTES NFR BLD AUTO: 10.5 % (ref 0–13.4)
NEUTROPHILS # BLD AUTO: 2.83 K/UL (ref 1.82–7.42)
NEUTROPHILS NFR BLD: 61.8 % (ref 44–72)
NRBC # BLD AUTO: 0 K/UL
NRBC BLD-RTO: 0 /100 WBC
PLATELET # BLD AUTO: 219 K/UL (ref 164–446)
PMV BLD AUTO: 9.8 FL (ref 9–12.9)
POTASSIUM SERPL-SCNC: 3.5 MMOL/L (ref 3.6–5.5)
RBC # BLD AUTO: 2.31 M/UL (ref 4.7–6.1)
SODIUM SERPL-SCNC: 140 MMOL/L (ref 135–145)
WBC # BLD AUTO: 4.6 K/UL (ref 4.8–10.8)

## 2019-04-14 PROCEDURE — 700105 HCHG RX REV CODE 258: Performed by: INTERNAL MEDICINE

## 2019-04-14 PROCEDURE — 700102 HCHG RX REV CODE 250 W/ 637 OVERRIDE(OP): Performed by: INTERNAL MEDICINE

## 2019-04-14 PROCEDURE — 160204 HCHG ENDO MINUTES - 1ST 30 MINS LEVEL 5: Performed by: INTERNAL MEDICINE

## 2019-04-14 PROCEDURE — 85025 COMPLETE CBC W/AUTO DIFF WBC: CPT

## 2019-04-14 PROCEDURE — A9270 NON-COVERED ITEM OR SERVICE: HCPCS | Performed by: HOSPITALIST

## 2019-04-14 PROCEDURE — 36415 COLL VENOUS BLD VENIPUNCTURE: CPT

## 2019-04-14 PROCEDURE — 700102 HCHG RX REV CODE 250 W/ 637 OVERRIDE(OP): Performed by: HOSPITALIST

## 2019-04-14 PROCEDURE — 0DJD8ZZ INSPECTION OF LOWER INTESTINAL TRACT, VIA NATURAL OR ARTIFICIAL OPENING ENDOSCOPIC: ICD-10-PCS | Performed by: INTERNAL MEDICINE

## 2019-04-14 PROCEDURE — 700101 HCHG RX REV CODE 250: Performed by: ANESTHESIOLOGY

## 2019-04-14 PROCEDURE — 99232 SBSQ HOSP IP/OBS MODERATE 35: CPT | Performed by: INTERNAL MEDICINE

## 2019-04-14 PROCEDURE — 770020 HCHG ROOM/CARE - TELE (206)

## 2019-04-14 PROCEDURE — A9270 NON-COVERED ITEM OR SERVICE: HCPCS | Performed by: INTERNAL MEDICINE

## 2019-04-14 PROCEDURE — 80048 BASIC METABOLIC PNL TOTAL CA: CPT

## 2019-04-14 PROCEDURE — 700111 HCHG RX REV CODE 636 W/ 250 OVERRIDE (IP): Performed by: ANESTHESIOLOGY

## 2019-04-14 PROCEDURE — 700111 HCHG RX REV CODE 636 W/ 250 OVERRIDE (IP): Performed by: INTERNAL MEDICINE

## 2019-04-14 PROCEDURE — 160036 HCHG PACU - EA ADDL 30 MINS PHASE I: Performed by: INTERNAL MEDICINE

## 2019-04-14 PROCEDURE — 700111 HCHG RX REV CODE 636 W/ 250 OVERRIDE (IP)

## 2019-04-14 PROCEDURE — 160035 HCHG PACU - 1ST 60 MINS PHASE I: Performed by: INTERNAL MEDICINE

## 2019-04-14 PROCEDURE — 700105 HCHG RX REV CODE 258: Performed by: ANESTHESIOLOGY

## 2019-04-14 PROCEDURE — 160048 HCHG OR STATISTICAL LEVEL 1-5: Performed by: INTERNAL MEDICINE

## 2019-04-14 PROCEDURE — 160002 HCHG RECOVERY MINUTES (STAT): Performed by: INTERNAL MEDICINE

## 2019-04-14 PROCEDURE — 160009 HCHG ANES TIME/MIN: Performed by: INTERNAL MEDICINE

## 2019-04-14 RX ORDER — POTASSIUM CHLORIDE 20 MEQ/1
20 TABLET, EXTENDED RELEASE ORAL 2 TIMES DAILY
Status: COMPLETED | OUTPATIENT
Start: 2019-04-14 | End: 2019-04-15

## 2019-04-14 RX ORDER — SODIUM CHLORIDE, SODIUM LACTATE, POTASSIUM CHLORIDE, CALCIUM CHLORIDE 600; 310; 30; 20 MG/100ML; MG/100ML; MG/100ML; MG/100ML
INJECTION, SOLUTION INTRAVENOUS CONTINUOUS
Status: DISCONTINUED | OUTPATIENT
Start: 2019-04-14 | End: 2019-04-14 | Stop reason: HOSPADM

## 2019-04-14 RX ORDER — ONDANSETRON 2 MG/ML
4 INJECTION INTRAMUSCULAR; INTRAVENOUS
Status: DISCONTINUED | OUTPATIENT
Start: 2019-04-14 | End: 2019-04-14 | Stop reason: HOSPADM

## 2019-04-14 RX ORDER — METOPROLOL TARTRATE 1 MG/ML
1 INJECTION, SOLUTION INTRAVENOUS
Status: DISCONTINUED | OUTPATIENT
Start: 2019-04-14 | End: 2019-04-14 | Stop reason: HOSPADM

## 2019-04-14 RX ORDER — HYDRALAZINE HYDROCHLORIDE 20 MG/ML
5 INJECTION INTRAMUSCULAR; INTRAVENOUS
Status: DISCONTINUED | OUTPATIENT
Start: 2019-04-14 | End: 2019-04-14 | Stop reason: HOSPADM

## 2019-04-14 RX ORDER — SODIUM CHLORIDE, SODIUM LACTATE, POTASSIUM CHLORIDE, CALCIUM CHLORIDE 600; 310; 30; 20 MG/100ML; MG/100ML; MG/100ML; MG/100ML
INJECTION, SOLUTION INTRAVENOUS
Status: DISCONTINUED | OUTPATIENT
Start: 2019-04-14 | End: 2019-04-14 | Stop reason: SURG

## 2019-04-14 RX ORDER — HALOPERIDOL 5 MG/ML
0.5 INJECTION INTRAMUSCULAR
Status: DISCONTINUED | OUTPATIENT
Start: 2019-04-14 | End: 2019-04-14 | Stop reason: HOSPADM

## 2019-04-14 RX ADMIN — OMEPRAZOLE 20 MG: 20 CAPSULE, DELAYED RELEASE ORAL at 18:06

## 2019-04-14 RX ADMIN — PROPOFOL 50 MG: 10 INJECTION, EMULSION INTRAVENOUS at 11:24

## 2019-04-14 RX ADMIN — PROPOFOL 100 MCG/KG/MIN: 10 INJECTION, EMULSION INTRAVENOUS at 11:26

## 2019-04-14 RX ADMIN — LEVOTHYROXINE SODIUM 50 MCG: 50 TABLET ORAL at 05:18

## 2019-04-14 RX ADMIN — POTASSIUM CHLORIDE: 2 INJECTION, SOLUTION, CONCENTRATE INTRAVENOUS at 07:46

## 2019-04-14 RX ADMIN — PROPOFOL 30 MG: 10 INJECTION, EMULSION INTRAVENOUS at 11:28

## 2019-04-14 RX ADMIN — ALFENTANIL HYDROCHLORIDE 250 MCG: 500 INJECTION, SOLUTION INTRAVENOUS at 11:24

## 2019-04-14 RX ADMIN — OMEPRAZOLE 20 MG: 20 CAPSULE, DELAYED RELEASE ORAL at 05:17

## 2019-04-14 RX ADMIN — LIDOCAINE HYDROCHLORIDE 100 MG: 20 INJECTION, SOLUTION INTRAVENOUS at 11:24

## 2019-04-14 RX ADMIN — AMIODARONE HYDROCHLORIDE 100 MG: 200 TABLET ORAL at 05:17

## 2019-04-14 RX ADMIN — PROPOFOL 30 MG: 10 INJECTION, EMULSION INTRAVENOUS at 11:27

## 2019-04-14 RX ADMIN — SODIUM CHLORIDE, POTASSIUM CHLORIDE, SODIUM LACTATE AND CALCIUM CHLORIDE: 600; 310; 30; 20 INJECTION, SOLUTION INTRAVENOUS at 10:42

## 2019-04-14 RX ADMIN — POTASSIUM CHLORIDE 20 MEQ: 1500 TABLET, EXTENDED RELEASE ORAL at 18:06

## 2019-04-14 ASSESSMENT — ENCOUNTER SYMPTOMS
SHORTNESS OF BREATH: 0
DIARRHEA: 0
BLOOD IN STOOL: 1
NAUSEA: 0
DIZZINESS: 0
VOMITING: 0
ABDOMINAL PAIN: 0

## 2019-04-14 ASSESSMENT — PAIN SCALES - GENERAL: PAIN_LEVEL: 0

## 2019-04-14 NOTE — ANESTHESIA TIME REPORT
Anesthesia Start and Stop Event Times     Date Time Event    4/14/2019 1110 Anesthesia Start     1143 Anesthesia Stop        Responsible Staff  04/14/19    Name Role Begin End    Logan Lara M.D. Anesth 1110 1143        Preop Diagnosis (Free Text):  Pre-op Diagnosis     GI bleed        Preop Diagnosis (Codes):  Diagnosis Information     Diagnosis Code(s):         Post op Diagnosis  GI bleed  acute anemia requiring transfusion of RBC's    Premium Reason  E. Weekend    Comments:

## 2019-04-14 NOTE — CARE PLAN
Problem: Venous Thromboembolism (VTW)/Deep Vein Thrombosis (DVT) Prevention:  Goal: Patient will participate in Venous Thrombosis (VTE)/Deep Vein Thrombosis (DVT)Prevention Measures   04/14/19 1649   Mechanical/VTE Prophylaxis   Mechanical Prophylaxis  SCDs, Sequential Compression Device   SCDs, Sequential Compression Device On       Problem: Pain Management  Goal: Pain level will decrease to patient's comfort goal  Outcome: PROGRESSING AS EXPECTED  Pt has denied pain through out the shift.

## 2019-04-14 NOTE — OP REPORT
DATE OF SERVICE:  04/14/2019    PREOPERATIVE DIAGNOSIS:  Gastrointestinal bleed.    POSTOPERATIVE DIAGNOSES:  Colonic diverticulosis and internal hemorrhoids.    ANESTHESIA:  Per anesthesiologist in the operating room.    DESCRIPTION OF PROCEDURE:  After informed consent and appropriate sedation,   the patient was placed in the left lateral position and the adult colonoscope   was advanced through the rectum through to the cecum, which was confirmed by   presence of the ileocecal valve and appendiceal orifice.  The ileocecal valve   was intubated successfully and the terminal ileum showed no signs of blood.    The remainder of the colon also showed no blood.  There was pancolonic   diverticulosis of moderate severity with no bleeding.  Otherwise, the mucosa   was normal in the ascending, transverse, descending, sigmoid and rectum.  On   retroflexion in the rectum were grade II internal hemorrhoids, which were   nonbleeding.  The bowel was decompressed and the scope was withdrawn.  There   were no immediate postop complications.    RECOMMENDATIONS:  1.  If patient continues to bleed, recommend getting a bleeding scan either a   CT abdomen with contrast or TRBC scan to look for a potential source, could   consider a push enteroscopy if the patient continues to bleed.  2.  Recommend high fiber diet, could consider outpatient banding of internal   hemorrhoids if any hematochezia persists.  3.  Advance diet as tolerated.  4.  Transfuse for any hemoglobin less than 7.  5.  If the patient is hemodynamically stable by tomorrow, not requiring any   transfusions could discharge with close followup as an outpatient.  6.  We will sign off for now.  Please call with any questions or concerns.       ____________________________________     DO ALVARO Escamilla / ANTONIO    DD:  04/14/2019 11:39:44  DT:  04/14/2019 12:24:34    D#:  [id number]  Job#:  [id number]

## 2019-04-14 NOTE — PROGRESS NOTES
Received bedside report from RN , pt care assumed, VSS, pt assessment complete. Pt AAOx4, no c/o pain at this time. On RA. No signs of acute distress noted at this time. POC discussed with pt and verbalizes no questions. Pt denies any additional needs at this time. Bed locked and lowest position, pt educated on fall risk and verbalized understanding, call light within reach. Will continue to monitor during hourly rounding.

## 2019-04-14 NOTE — PROGRESS NOTES
Patient returned to room from [facility], placed back on Cardiac monitor. Post-op vitals started. Pt ambulated to the bathroom with CNA. Denied dizziness, or lightheadness. Steady gait. Called MD to get diet order. Will continue to monitor during hourly rounding.

## 2019-04-14 NOTE — PROGRESS NOTES
Patient transported down to Endo via Primary RN, ACLS trained and transported via bed. ZOLL in place for cardiac monitoring.

## 2019-04-14 NOTE — PROGRESS NOTES
[facility] Medicine Daily Progress Note    Date of Service  4/14/2019    Chief Complaint  87 y.o. male admitted 4/11/2019 with GIB.    Hospital Course    PH pafib not on anticoagulation who recently had knee surgery and constipation who presented with dizziness and hematochezia. Hgb was down from 11 to 7.6 and given blood transfusion. Dr. Be with GI was consulted.        Interval Problem Update  Hgb remains ~7  HypoK, replete   He had a mixture of watery stool and blood this morning  Colonoscopy showed no active bleeding, hemorrhoids    Consultants/Specialty  GI    Code Status  Full    Disposition  Home tomorrow if hemoglobin stable and no further bleeding  Follow-up with [facility] as outpatient      Review of Systems  Review of Systems   Constitutional: Negative for malaise/fatigue.   Respiratory: Negative for shortness of breath.    Cardiovascular: Negative for chest pain.   Gastrointestinal: Positive for blood in stool. Negative for abdominal pain, diarrhea, nausea and vomiting.   Skin: Negative for itching.   Neurological: Negative for dizziness.        Physical Exam  Temp:  [36.3 °C (97.3 °F)-37.1 °C (98.7 °F)] 36.4 °C (97.5 °F)  Pulse:  [58-83] 72  Resp:  [11-25] 11  BP: (115-161)/(66-90) 161/82  SpO2:  [92 %-100 %] 93 %    Physical Exam   Constitutional: He is oriented to person, place, and time. He appears well-developed and well-nourished.   HENT:   Head: Normocephalic.   Mouth/Throat: Oropharynx is clear and moist.   Eyes: Conjunctivae are normal. Right eye exhibits no discharge. Left eye exhibits no discharge.   Cardiovascular: Normal rate and regular rhythm.    Pulmonary/Chest: Effort normal. He has no wheezes.   Abdominal: Soft. There is no tenderness.   Musculoskeletal: He exhibits no edema.   Left knee with healing surgical wound, swelling, nontender   Neurological: He is alert and oriented to person, place, and time.   Skin: Skin is warm and dry. There is pallor.   Nursing note and vitals  reviewed.      Fluids    Intake/Output Summary (Last 24 hours) at 04/14/19 1247  Last data filed at 04/14/19 1200   Gross per 24 hour   Intake              400 ml   Output             1200 ml   Net             -800 ml       Laboratory  Recent Labs      04/11/19   1310   04/13/19   0549  04/13/19   1759  04/14/19   0443   WBC  7.8   --    --   5.2  4.6*   RBC  2.43*   --    --   2.64*  2.31*   HEMOGLOBIN  7.9*   < >  7.7*  8.6*  7.5*   HEMATOCRIT  25.5*   --    --   26.6*  23.0*   MCV  104.9*   --    --   100.8*  99.6*   MCH  32.5   --    --   32.6  32.5   MCHC  31.0*   --    --   32.3*  32.6*   RDW  56.3*   --    --   55.0*  54.2*   PLATELETCT  282   --    --   242  219   MPV  10.3   --    --   9.8  9.8    < > = values in this interval not displayed.     Recent Labs      04/11/19   1310  04/13/19   0012  04/14/19   0443   SODIUM  137  144  140   POTASSIUM  4.6  3.8  3.5*   CHLORIDE  109  115*  112   CO2  22  21  22   GLUCOSE  92  72  87   BUN  40*  25*  16   CREATININE  1.34  1.17  1.08   CALCIUM  9.1  8.0*  8.3*     Recent Labs      04/11/19   1355   APTT  33.1   INR  1.13               Imaging  DX-CHEST-PORTABLE (1 VIEW)   Final Result      1.  There is no acute cardiopulmonary process.           Assessment/Plan  * GI bleed- (present on admission)   Assessment & Plan    This is most consistent with an upper GI bleed given his elevated BUN though this time patient's story of the color of his stools could be lower as well.  He had been on Celebrex and aspirin  GI consulted, EGD showed Junior's esophagus and PUD.  Continue on BID omeprazole  Colonoscopy showed no active bleeding, hemorrhoids  Monitor for further bleeding, follow-up with [facility] as outpatient  Treatment for constipation, high-fiber diet     Anemia due to acute blood loss- (present on admission)   Assessment & Plan    Given 1 unit PRBC  Monitor hemoglobin, transfuse if less than 7     Paroxysmal atrial fibrillation (HCC)- (present on admission)    Assessment & Plan    History of atrial fibrillation for which is on amiodarone and not on anticoagulation  Monitor on telemetry     Acquired hypothyroidism- (present on admission)   Assessment & Plan    Continue Synthroid     Status post left knee replacement- (present on admission)   Assessment & Plan    By Dr. Mary on 3/20/2019  Recovering well          VTE prophylaxis: scds, GIB

## 2019-04-14 NOTE — ANESTHESIA QCDR
2019 [facility] Clinical Data Registry (for Quality Improvement)     Postoperative nausea/vomiting risk protocol (Adult = 18 yrs and Pediatric 3-17 yrs)- (430 and 463)  General inhalation anesthetic (NOT TIVA) with PONV risk factors: No  Provision of anti-emetic therapy with at least 2 different classes of agents: N/A  Patient DID NOT receive anti-emetic therapy and reason is documented in Medical Record: N/A    Multimodal Pain Management- (AQI59)  Patient undergoing Elective Surgery (i.e. Outpatient, or ASC, or Prescheduled Surgery prior to Hospital Admission): No  Use of Multimodal Pain Management, two or more drugs and/or interventions, NOT including systemic opioids: N/A  Exception: Documented allergy to multiple classes of analgesics: N/A    PACU assessment of acute postoperative pain prior to Anesthesia Care End- Applies to Patients Age = 18- (ABG7)  Initial PACU pain score is which of the following: < 7/10  Patient unable to report pain score: N/A    Post-anesthetic transfer of care checklist/protocol to PACU/ICU- (426 and 427)  Upon conclusion of case, patient transferred to which of the following locations: PACU/Non-ICU  Use of transfer checklist/protocol: Yes  Exclusion: Service Performed in Patient Hospital Room (and thus did not require transfer): N/A    PACU Reintubation- (AQI31)  General anesthesia requiring endotracheal intubation (ETT) along with subsequent extubation in OR or PACU: No  Required reintubation in the PACU: N/A  Extubation was a planned trial documented in the medical record prior to removal of the original airway device: N/A    Unplanned admission to ICU related to anesthesia service up through end of PACU care- (MD51)  Unplanned admission to ICU (not initially anticipated at anesthesia start time): No

## 2019-04-14 NOTE — ANESTHESIA POSTPROCEDURE EVALUATION
Patient: Rc Auguste    Procedure Summary     Date:  04/14/19 Room / Location:  [facility] OR 06 / SURGERY [facility]    Anesthesia Start:  1110 Anesthesia Stop:  1143    Procedure:  COLONOSCOPY (N/A Anus) Diagnosis:  (diverticulosis)    Surgeon:  Lj Darby D.O. Responsible Provider:  Logan Lara M.D.    Anesthesia Type:  general, MAC ASA Status:  3          Final Anesthesia Type: general, MAC  Last vitals  BP   Blood Pressure : (!) 161/82, NIBP: (!) 81/40. 85/45   Temp   36.4 °C (97.5 °F)    Pulse   Pulse: (!) 58, Heart Rate (Monitored): (!) 56   Resp   13    SpO2   100 %      Anesthesia Post Evaluation    Patient location during evaluation: PACU  Patient participation: complete - patient participated  Level of consciousness: sleepy but conscious (easily awakened)  Pain score: 0    Airway patency: patent  Anesthetic complications: no  Cardiovascular status: hemodynamically stable  Respiratory status: acceptable  Hydration status: euvolemic    PONV: none           Nurse Pain Score: 0 (NPRS)

## 2019-04-15 ENCOUNTER — PATIENT OUTREACH (OUTPATIENT)
Dept: HEALTH INFORMATION MANAGEMENT | Facility: OTHER | Age: 84
End: 2019-04-15

## 2019-04-15 VITALS
TEMPERATURE: 97.7 F | SYSTOLIC BLOOD PRESSURE: 133 MMHG | BODY MASS INDEX: 24.72 KG/M2 | DIASTOLIC BLOOD PRESSURE: 86 MMHG | RESPIRATION RATE: 16 BRPM | HEIGHT: 72 IN | WEIGHT: 182.54 LBS | OXYGEN SATURATION: 96 % | HEART RATE: 77 BPM

## 2019-04-15 LAB
ANION GAP SERPL CALC-SCNC: 7 MMOL/L (ref 0–11.9)
BASOPHILS # BLD AUTO: 0.5 % (ref 0–1.8)
BASOPHILS # BLD: 0.03 K/UL (ref 0–0.12)
BUN SERPL-MCNC: 17 MG/DL (ref 8–22)
CALCIUM SERPL-MCNC: 8.6 MG/DL (ref 8.5–10.5)
CHLORIDE SERPL-SCNC: 111 MMOL/L (ref 96–112)
CO2 SERPL-SCNC: 22 MMOL/L (ref 20–33)
CREAT SERPL-MCNC: 1.21 MG/DL (ref 0.5–1.4)
EOSINOPHIL # BLD AUTO: 0.2 K/UL (ref 0–0.51)
EOSINOPHIL NFR BLD: 3.6 % (ref 0–6.9)
ERYTHROCYTE [DISTWIDTH] IN BLOOD BY AUTOMATED COUNT: 55.6 FL (ref 35.9–50)
GLUCOSE SERPL-MCNC: 100 MG/DL (ref 65–99)
HCT VFR BLD AUTO: 24.3 % (ref 42–52)
HGB BLD-MCNC: 7.7 G/DL (ref 14–18)
IMM GRANULOCYTES # BLD AUTO: 0.03 K/UL (ref 0–0.11)
IMM GRANULOCYTES NFR BLD AUTO: 0.5 % (ref 0–0.9)
LYMPHOCYTES # BLD AUTO: 1.16 K/UL (ref 1–4.8)
LYMPHOCYTES NFR BLD: 20.6 % (ref 22–41)
MAGNESIUM SERPL-MCNC: 2.2 MG/DL (ref 1.5–2.5)
MCH RBC QN AUTO: 32.2 PG (ref 27–33)
MCHC RBC AUTO-ENTMCNC: 31.7 G/DL (ref 33.7–35.3)
MCV RBC AUTO: 101.7 FL (ref 81.4–97.8)
MONOCYTES # BLD AUTO: 0.6 K/UL (ref 0–0.85)
MONOCYTES NFR BLD AUTO: 10.7 % (ref 0–13.4)
NEUTROPHILS # BLD AUTO: 3.6 K/UL (ref 1.82–7.42)
NEUTROPHILS NFR BLD: 64.1 % (ref 44–72)
NRBC # BLD AUTO: 0 K/UL
NRBC BLD-RTO: 0 /100 WBC
PLATELET # BLD AUTO: 204 K/UL (ref 164–446)
PMV BLD AUTO: 10.4 FL (ref 9–12.9)
POTASSIUM SERPL-SCNC: 3.9 MMOL/L (ref 3.6–5.5)
RBC # BLD AUTO: 2.39 M/UL (ref 4.7–6.1)
SODIUM SERPL-SCNC: 140 MMOL/L (ref 135–145)
WBC # BLD AUTO: 5.6 K/UL (ref 4.8–10.8)

## 2019-04-15 PROCEDURE — 700102 HCHG RX REV CODE 250 W/ 637 OVERRIDE(OP): Performed by: INTERNAL MEDICINE

## 2019-04-15 PROCEDURE — A9270 NON-COVERED ITEM OR SERVICE: HCPCS | Performed by: INTERNAL MEDICINE

## 2019-04-15 PROCEDURE — 80048 BASIC METABOLIC PNL TOTAL CA: CPT

## 2019-04-15 PROCEDURE — A9270 NON-COVERED ITEM OR SERVICE: HCPCS | Performed by: HOSPITALIST

## 2019-04-15 PROCEDURE — 85025 COMPLETE CBC W/AUTO DIFF WBC: CPT

## 2019-04-15 PROCEDURE — 83735 ASSAY OF MAGNESIUM: CPT

## 2019-04-15 PROCEDURE — 700102 HCHG RX REV CODE 250 W/ 637 OVERRIDE(OP): Performed by: HOSPITALIST

## 2019-04-15 PROCEDURE — 99239 HOSP IP/OBS DSCHRG MGMT >30: CPT | Performed by: INTERNAL MEDICINE

## 2019-04-15 PROCEDURE — 36415 COLL VENOUS BLD VENIPUNCTURE: CPT

## 2019-04-15 RX ORDER — AMOXICILLIN 250 MG
2 CAPSULE ORAL
Qty: 30 TAB | Refills: 3 | Status: SHIPPED | OUTPATIENT
Start: 2019-04-15 | End: 2019-05-15

## 2019-04-15 RX ORDER — OMEPRAZOLE 20 MG/1
20 CAPSULE, DELAYED RELEASE ORAL 2 TIMES DAILY
Qty: 30 CAP | Refills: 0 | Status: SHIPPED | OUTPATIENT
Start: 2019-04-15 | End: 2019-07-17

## 2019-04-15 RX ORDER — ASPIRIN 81 MG/1
81 TABLET, CHEWABLE ORAL DAILY
Qty: 30 TAB | Refills: 0 | Status: SHIPPED | OUTPATIENT
Start: 2019-04-15 | End: 2019-07-17

## 2019-04-15 RX ORDER — FERROUS SULFATE 325(65) MG
325 TABLET ORAL DAILY
Qty: 30 TAB | Refills: 0 | Status: SHIPPED | OUTPATIENT
Start: 2019-04-15 | End: 2019-07-17

## 2019-04-15 RX ADMIN — LEVOTHYROXINE SODIUM 50 MCG: 50 TABLET ORAL at 05:15

## 2019-04-15 RX ADMIN — OMEPRAZOLE 20 MG: 20 CAPSULE, DELAYED RELEASE ORAL at 05:15

## 2019-04-15 RX ADMIN — POTASSIUM CHLORIDE 20 MEQ: 1500 TABLET, EXTENDED RELEASE ORAL at 05:15

## 2019-04-15 RX ADMIN — SENNOSIDES, DOCUSATE SODIUM 2 TABLET: 50; 8.6 TABLET, FILM COATED ORAL at 05:14

## 2019-04-15 RX ADMIN — AMIODARONE HYDROCHLORIDE 100 MG: 200 TABLET ORAL at 05:15

## 2019-04-15 RX ADMIN — Medication 1 PACKET: at 05:18

## 2019-04-15 NOTE — PROGRESS NOTES
Pt. And wife. Given discharge instructions, discussed diet, activity, follow up appointments, symptoms & management, and prescriptions provided. Packet sent with patient, IV d/c'd, tele box off and all questions answered. Patient escorted off unit with wife.

## 2019-04-15 NOTE — PROGRESS NOTES
Received bedside report from RN , pt care assumed, VSS, pt assessment complete. Pt AAOx4, no c/o pain at this time. On RA. No signs of acute distress noted at this time. POC discussed with pt and verbalizes no questions. Awaiting GI sign off for discharge. Pt denies any additional needs at this time. Bed locked and lowest position, pt educated on fall risk and verbalized understanding, call light within reach. Will continue to monitor during hourly rounding.

## 2019-04-15 NOTE — PROGRESS NOTES
Received bedside report from RN , pt care assumed, VSS, pt assessment complete. Pt AOx3, no c/o pain at this time. POC discussed with pt and verbalizes no questions. Pt denies any additional needs at this time. Bed locked and lowest position, pt educated on fall risk and verbalized understanding, call light within reach. Will continue to monitor during hourly rounding.

## 2019-04-15 NOTE — DISCHARGE INSTRUCTIONS
Discharge Instructions    Discharged to home by car with relative. Discharged via wheelchair, hospital escort: Yes.  Special equipment needed: Not Applicable    Be sure to schedule a follow-up appointment with your primary care doctor or any specialists as instructed.     Discharge Plan:   Influenza Vaccine Indication: Not indicated: Previously immunized this influenza season and > 8 years of age    I understand that a diet low in cholesterol, fat, and sodium is recommended for good health. Unless I have been given specific instructions below for another diet, I accept this instruction as my diet prescription.   Other diet:     Special Instructions: None    · Is patient discharged on Warfarin / Coumadin?   No       Gastrointestinal Bleeding  Introduction  Gastrointestinal bleeding is bleeding somewhere along the path food travels through the body (digestive tract). This path is anywhere between the mouth and the opening of the butt (anus). You may have blood in your poop (stools) or have black poop. If you throw up (vomit), there may be blood in it.  This condition can be mild, serious, or even life-threatening. If you have a lot of bleeding, you may need to stay in the hospital.  Follow these instructions at home:  · Take over-the-counter and prescription medicines only as told by your doctor.  · Eat foods that have a lot of fiber in them. These foods include whole grains, fruits, and vegetables. You can also try eating 1-3 prunes each day.  · Drink enough fluid to keep your pee (urine) clear or pale yellow.  · Keep all follow-up visits as told by your doctor. This is important.  Contact a doctor if:  · Your symptoms do not get better.  Get help right away if:  · Your bleeding gets worse.  · You feel dizzy or you pass out (faint).  · You feel weak.  · You have very bad cramps in your back or belly (abdomen).  · You pass large clumps of blood (clots) in your poop.  · Your symptoms are getting worse.  This  information is not intended to replace advice given to you by your health care provider. Make sure you discuss any questions you have with your health care provider.  Document Released: 09/26/2009 Document Revised: 05/25/2017 Document Reviewed: 06/06/2016  © 2017 [employer]      Depression / Suicide Risk    As you are discharged from this [facility] facility, it is important to learn how to keep safe from harming yourself.    Recognize the warning signs:  · Abrupt changes in personality, positive or negative- including increase in energy   · Giving away possessions  · Change in eating patterns- significant weight changes-  positive or negative  · Change in sleeping patterns- unable to sleep or sleeping all the time   · Unwillingness or inability to communicate  · Depression  · Unusual sadness, discouragement and loneliness  · Talk of wanting to die  · Neglect of personal appearance   · Rebelliousness- reckless behavior  · Withdrawal from people/activities they love  · Confusion- inability to concentrate     If you or a loved one observes any of these behaviors or has concerns about self-harm, here's what you can do:  · Talk about it- your feelings and reasons for harming yourself  · Remove any means that you might use to hurt yourself (examples: pills, rope, extension cords, firearm)  · Get professional help from the community (Mental Health, Substance Abuse, psychological counseling)  · Do not be alone:Call your Safe Contact- someone whom you trust who will be there for you.  · Call your local CRISIS HOTLINE 244-9018 or 003-366-6267  · Call your local Children's Mobile Crisis Response Team [place] (817) 574-6129 or www.Guidecentral  · Call the toll free National Suicide Prevention Hotlines   · National Suicide Prevention Lifeline 480-247-JJMP (5846)  · National Hope Line Network 800-SUICIDE (283-8013)

## 2019-04-15 NOTE — DISCHARGE SUMMARY
Discharge Summary    CHIEF COMPLAINT ON ADMISSION  Chief Complaint   Patient presents with   • Bloody Stools     x4 days.        Reason for Admission  EMS     Admission Date  4/11/2019    CODE STATUS  Prior    HPI & HOSPITAL COURSE  This is a 87 y.o. male here with GIB.     PH pafib not on anticoagulation who recently had knee surgery and constipation who presented with dizziness and hematochezia. Hgb was down from 11 to 7.6 and given blood transfusion. Dr. Be with GI was consulted. EGD showed hiatal hernia, possible Junior's esophagus, and peptic ulcers. Colonoscopy showed diverticulosis and internal hemorrhoids. He was started on omeprazole and stool softeners. His Hgb stayed stable ~7. He had no further dizziness and ambulating well. Bowel regimen for constipation and starting on iron supplement was discussed with the wife and patient prior to discharge.    Therefore, he is discharged in good and stable condition to home with close outpatient follow-up.    The patient met 2-midnight criteria for an inpatient stay at the time of discharge.    Discharge Date  4/15/2019    FOLLOW UP ITEMS POST DISCHARGE  F/u with [facility]  F/e anemia    DISCHARGE DIAGNOSES  Principal Problem:    GI bleed POA: Yes  Active Problems:    Anemia due to acute blood loss POA: Yes    Status post left knee replacement (Chronic) POA: Yes    Acquired hypothyroidism POA: Yes    Paroxysmal atrial fibrillation (HCC) POA: Yes  Resolved Problems:    * No resolved hospital problems. *      FOLLOW UP  Nsetor Jiang M.D.  [address]  [address]  [facility] [address]  [phone]    Go on 4/23/2019  Please arrive at 11:00 am for a 11:15 am appointment with your primary care provider.    Lj Darby D.O.  [address] [place] [id number] [phone]      The GI office will be calling you directly to schedule a 2-3 week follow up appointment. If you do not receive a call within 2 days please call to arrange an appointment.        MEDICATIONS ON DISCHARGE     Medication List      START taking these medications      Instructions   ferrous sulfate 325 (65 Fe) MG tablet   Take 1 Tab by mouth every day.  Dose:  325 mg     omeprazole 20 MG delayed-release capsule  Commonly known as:  PRILOSEC   Take 1 Cap by mouth 2 Times a Day.  Dose:  20 mg     Psyllium 0.52 GM Caps   Take 1 Cap by mouth every day. For constipation  Dose:  1 Cap     senna-docusate 8.6-50 MG Tabs  Commonly known as:  PERICOLACE or SENOKOT S   Take 2 Tabs by mouth 1 time daily as needed for Constipation for up to 30 days.  Dose:  2 Tab        CHANGE how you take these medications      Instructions   aspirin 81 MG Chew chewable tablet  What changed:  when to take this  Commonly known as:  ASA   Take 1 Tab by mouth every day.  Dose:  81 mg        CONTINUE taking these medications      Instructions   acetaminophen 500 MG Tabs  Commonly known as:  TYLENOL   Take 1,000 mg by mouth every 6 hours as needed.  Dose:  1000 mg     amiodarone 100 MG tablet  Commonly known as:  PACERONE   Take 100 mg by mouth every day.  Dose:  100 mg     CALCIUM PO   Take 1 Tab by mouth every day.  Dose:  1 Tab     docosahexanoic acid 1000 MG Caps  Commonly known as:  OMEGA 3 FA   Take 1,000 mg by mouth every day.  Dose:  1000 mg     HYDROcodone/acetaminophen  MG Tabs  Commonly known as:  NORCO   Take 0.5-1 Tabs by mouth every four hours as needed.  Dose:  0.5-1 Tab     levothyroxine 50 MCG Tabs  Commonly known as:  SYNTHROID   Take 50 mcg by mouth Every morning on an empty stomach.  Dose:  50 mcg     MULTI-VITAMIN PO   Take 1 Tab by mouth every day.  Dose:  1 Tab     tramadol 50 MG Tabs  Commonly known as:  ULTRAM   Take 50 mg by mouth every four hours as needed.  Dose:  50 mg     VITAMIN C PO   Take 1 Tab by mouth every day.  Dose:  1 Tab        STOP taking these medications    celecoxib 200 MG Caps  Commonly known as:  CELEBREX            Allergies  Allergies   Allergen Reactions   • Nkda [No  Known Drug Allergy]        DIET  No orders of the defined types were placed in this encounter.      ACTIVITY  As tolerated.  Weight bearing as tolerated    CONSULTATIONS  GI    PROCEDURES  DATE OF SERVICE:  04/12/2019  PROCEDURE:  Esophagogastroduodenoscopy.  PREOPERATIVE DIAGNOSES:  Gastrointestinal bleed, melena.  POSTOPERATIVE DIAGNOSES:  Hiatal hernia, possible Junior's esophagus and peptic ulcers.    DATE OF SERVICE:  04/14/2019  PREOPERATIVE DIAGNOSIS:  Gastrointestinal bleed.  POSTOPERATIVE DIAGNOSES:  Colonic diverticulosis and internal hemorrhoids.    LABORATORY  Lab Results   Component Value Date    SODIUM 140 04/15/2019    POTASSIUM 3.9 04/15/2019    CHLORIDE 111 04/15/2019    CO2 22 04/15/2019    GLUCOSE 100 (H) 04/15/2019    BUN 17 04/15/2019    CREATININE 1.21 04/15/2019    CREATININE 1.4 06/19/2007        Lab Results   Component Value Date    WBC 5.6 04/15/2019    WBC 4.7 05/30/2012    HEMOGLOBIN 7.7 (L) 04/15/2019    HEMATOCRIT 24.3 (L) 04/15/2019    PLATELETCT 204 04/15/2019        Total time of the discharge process exceeds 32 minutes.

## 2019-04-15 NOTE — CARE PLAN
Problem: Infection  Goal: Will remain free from infection  Outcome: PROGRESSING AS EXPECTED  Hand hygiene performed before and after each interaction with this patient     Problem: Venous Thromboembolism (VTW)/Deep Vein Thrombosis (DVT) Prevention:  Goal: Patient will participate in Venous Thrombosis (VTE)/Deep Vein Thrombosis (DVT)Prevention Measures  Outcome: PROGRESSING AS EXPECTED  Patient ambulating in halls and compliant with SCD

## 2019-04-15 NOTE — PROGRESS NOTES
Patient resting between care. C/O of SCDs interfering with sleep, education provided on importance of SCD, patient continuing to refuse. Will continue to provide education. VSS. No complaints of pain. Fall precautions in place. Call light in reach. Will continue to monitor with hourly monitoring.

## 2019-07-17 ENCOUNTER — APPOINTMENT (OUTPATIENT)
Dept: RADIOLOGY | Facility: MEDICAL CENTER | Age: 84
End: 2019-07-17
Attending: EMERGENCY MEDICINE
Payer: COMMERCIAL

## 2019-07-17 ENCOUNTER — HOSPITAL ENCOUNTER (OUTPATIENT)
Facility: MEDICAL CENTER | Age: 84
End: 2019-07-18
Attending: EMERGENCY MEDICINE | Admitting: HOSPITALIST
Payer: COMMERCIAL

## 2019-07-17 DIAGNOSIS — M54.6 BILATERAL THORACIC BACK PAIN, UNSPECIFIED CHRONICITY: ICD-10-CM

## 2019-07-17 DIAGNOSIS — S22.000A COMPRESSION FRACTURE OF BODY OF THORACIC VERTEBRA (HCC): ICD-10-CM

## 2019-07-17 DIAGNOSIS — M48.54XA NON-TRAUMATIC COMPRESSION FRACTURE OF ELEVENTH THORACIC VERTEBRA, INITIAL ENCOUNTER: ICD-10-CM

## 2019-07-17 PROBLEM — K59.00 CONSTIPATION: Status: ACTIVE | Noted: 2019-07-17

## 2019-07-17 LAB
ALBUMIN SERPL BCP-MCNC: 4.3 G/DL (ref 3.2–4.9)
ALBUMIN/GLOB SERPL: 1.2 G/DL
ALP SERPL-CCNC: 72 U/L (ref 30–99)
ALT SERPL-CCNC: 6 U/L (ref 2–50)
ANION GAP SERPL CALC-SCNC: 9 MMOL/L (ref 0–11.9)
APPEARANCE UR: CLEAR
AST SERPL-CCNC: 14 U/L (ref 12–45)
BASOPHILS # BLD AUTO: 0.7 % (ref 0–1.8)
BASOPHILS # BLD: 0.04 K/UL (ref 0–0.12)
BILIRUB SERPL-MCNC: 0.6 MG/DL (ref 0.1–1.5)
BILIRUB UR QL STRIP.AUTO: NEGATIVE
BUN SERPL-MCNC: 30 MG/DL (ref 8–22)
CALCIUM SERPL-MCNC: 9.7 MG/DL (ref 8.5–10.5)
CHLORIDE SERPL-SCNC: 107 MMOL/L (ref 96–112)
CO2 SERPL-SCNC: 23 MMOL/L (ref 20–33)
COLOR UR: YELLOW
CREAT SERPL-MCNC: 1.43 MG/DL (ref 0.5–1.4)
EOSINOPHIL # BLD AUTO: 0.12 K/UL (ref 0–0.51)
EOSINOPHIL NFR BLD: 2.2 % (ref 0–6.9)
ERYTHROCYTE [DISTWIDTH] IN BLOOD BY AUTOMATED COUNT: 51.4 FL (ref 35.9–50)
GLOBULIN SER CALC-MCNC: 3.7 G/DL (ref 1.9–3.5)
GLUCOSE SERPL-MCNC: 86 MG/DL (ref 65–99)
GLUCOSE UR STRIP.AUTO-MCNC: NEGATIVE MG/DL
HCT VFR BLD AUTO: 41.4 % (ref 42–52)
HGB BLD-MCNC: 13.8 G/DL (ref 14–18)
IMM GRANULOCYTES # BLD AUTO: 0.03 K/UL (ref 0–0.11)
IMM GRANULOCYTES NFR BLD AUTO: 0.6 % (ref 0–0.9)
KETONES UR STRIP.AUTO-MCNC: NEGATIVE MG/DL
LEUKOCYTE ESTERASE UR QL STRIP.AUTO: NEGATIVE
LYMPHOCYTES # BLD AUTO: 0.86 K/UL (ref 1–4.8)
LYMPHOCYTES NFR BLD: 15.8 % (ref 22–41)
MCH RBC QN AUTO: 32.1 PG (ref 27–33)
MCHC RBC AUTO-ENTMCNC: 33.3 G/DL (ref 33.7–35.3)
MCV RBC AUTO: 96.3 FL (ref 81.4–97.8)
MICRO URNS: NORMAL
MONOCYTES # BLD AUTO: 0.4 K/UL (ref 0–0.85)
MONOCYTES NFR BLD AUTO: 7.4 % (ref 0–13.4)
NEUTROPHILS # BLD AUTO: 3.98 K/UL (ref 1.82–7.42)
NEUTROPHILS NFR BLD: 73.3 % (ref 44–72)
NITRITE UR QL STRIP.AUTO: NEGATIVE
NRBC # BLD AUTO: 0 K/UL
NRBC BLD-RTO: 0 /100 WBC
PH UR STRIP.AUTO: 6 [PH]
PLATELET # BLD AUTO: 195 K/UL (ref 164–446)
PMV BLD AUTO: 10.3 FL (ref 9–12.9)
POTASSIUM SERPL-SCNC: 4.3 MMOL/L (ref 3.6–5.5)
PROT SERPL-MCNC: 8 G/DL (ref 6–8.2)
PROT UR QL STRIP: NEGATIVE MG/DL
RBC # BLD AUTO: 4.3 M/UL (ref 4.7–6.1)
RBC UR QL AUTO: NEGATIVE
SODIUM SERPL-SCNC: 139 MMOL/L (ref 135–145)
SP GR UR STRIP.AUTO: 1.02
UROBILINOGEN UR STRIP.AUTO-MCNC: 1 MG/DL
WBC # BLD AUTO: 5.4 K/UL (ref 4.8–10.8)

## 2019-07-17 PROCEDURE — 700102 HCHG RX REV CODE 250 W/ 637 OVERRIDE(OP): Performed by: HOSPITALIST

## 2019-07-17 PROCEDURE — 72148 MRI LUMBAR SPINE W/O DYE: CPT

## 2019-07-17 PROCEDURE — 99220 PR INITIAL OBSERVATION CARE,LEVL III: CPT | Performed by: HOSPITALIST

## 2019-07-17 PROCEDURE — A9270 NON-COVERED ITEM OR SERVICE: HCPCS | Performed by: HOSPITALIST

## 2019-07-17 PROCEDURE — 96374 THER/PROPH/DIAG INJ IV PUSH: CPT

## 2019-07-17 PROCEDURE — 72146 MRI CHEST SPINE W/O DYE: CPT

## 2019-07-17 PROCEDURE — 74177 CT ABD & PELVIS W/CONTRAST: CPT

## 2019-07-17 PROCEDURE — 81003 URINALYSIS AUTO W/O SCOPE: CPT

## 2019-07-17 PROCEDURE — G0378 HOSPITAL OBSERVATION PER HR: HCPCS

## 2019-07-17 PROCEDURE — 700117 HCHG RX CONTRAST REV CODE 255: Performed by: EMERGENCY MEDICINE

## 2019-07-17 PROCEDURE — 80053 COMPREHEN METABOLIC PANEL: CPT

## 2019-07-17 PROCEDURE — 72128 CT CHEST SPINE W/O DYE: CPT

## 2019-07-17 PROCEDURE — 99285 EMERGENCY DEPT VISIT HI MDM: CPT

## 2019-07-17 PROCEDURE — 72131 CT LUMBAR SPINE W/O DYE: CPT

## 2019-07-17 PROCEDURE — 700111 HCHG RX REV CODE 636 W/ 250 OVERRIDE (IP): Performed by: EMERGENCY MEDICINE

## 2019-07-17 PROCEDURE — 36415 COLL VENOUS BLD VENIPUNCTURE: CPT

## 2019-07-17 PROCEDURE — 85025 COMPLETE CBC W/AUTO DIFF WBC: CPT

## 2019-07-17 RX ORDER — PREDNISONE 20 MG/1
60 TABLET ORAL DAILY
Status: DISCONTINUED | OUTPATIENT
Start: 2019-07-17 | End: 2019-07-18

## 2019-07-17 RX ORDER — MULTIVIT WITH MINERALS/LUTEIN
1000 TABLET ORAL EVERY MORNING
COMMUNITY

## 2019-07-17 RX ORDER — ONDANSETRON 4 MG/1
4 TABLET, ORALLY DISINTEGRATING ORAL EVERY 4 HOURS PRN
Status: DISCONTINUED | OUTPATIENT
Start: 2019-07-17 | End: 2019-07-18 | Stop reason: HOSPADM

## 2019-07-17 RX ORDER — LEVOTHYROXINE SODIUM 0.03 MG/1
50 TABLET ORAL
Status: DISCONTINUED | OUTPATIENT
Start: 2019-07-18 | End: 2019-07-18 | Stop reason: HOSPADM

## 2019-07-17 RX ORDER — OXYCODONE HYDROCHLORIDE 5 MG/1
5-10 TABLET ORAL EVERY 4 HOURS PRN
Status: DISCONTINUED | OUTPATIENT
Start: 2019-07-17 | End: 2019-07-18 | Stop reason: HOSPADM

## 2019-07-17 RX ORDER — AMIODARONE HYDROCHLORIDE 200 MG/1
100 TABLET ORAL
Status: DISCONTINUED | OUTPATIENT
Start: 2019-07-17 | End: 2019-07-18 | Stop reason: HOSPADM

## 2019-07-17 RX ORDER — AMOXICILLIN 250 MG
2 CAPSULE ORAL 2 TIMES DAILY
Status: DISCONTINUED | OUTPATIENT
Start: 2019-07-17 | End: 2019-07-18 | Stop reason: HOSPADM

## 2019-07-17 RX ORDER — ACETAMINOPHEN 325 MG/1
650 TABLET ORAL EVERY 6 HOURS PRN
Status: DISCONTINUED | OUTPATIENT
Start: 2019-07-17 | End: 2019-07-18 | Stop reason: HOSPADM

## 2019-07-17 RX ORDER — MORPHINE SULFATE 4 MG/ML
1-4 INJECTION, SOLUTION INTRAMUSCULAR; INTRAVENOUS
Status: DISCONTINUED | OUTPATIENT
Start: 2019-07-17 | End: 2019-07-18 | Stop reason: HOSPADM

## 2019-07-17 RX ORDER — BISACODYL 10 MG
10 SUPPOSITORY, RECTAL RECTAL
Status: DISCONTINUED | OUTPATIENT
Start: 2019-07-17 | End: 2019-07-18 | Stop reason: HOSPADM

## 2019-07-17 RX ORDER — POLYETHYLENE GLYCOL 3350 17 G/17G
1 POWDER, FOR SOLUTION ORAL
Status: DISCONTINUED | OUTPATIENT
Start: 2019-07-17 | End: 2019-07-18 | Stop reason: HOSPADM

## 2019-07-17 RX ORDER — MORPHINE SULFATE 4 MG/ML
4 INJECTION, SOLUTION INTRAMUSCULAR; INTRAVENOUS ONCE
Status: COMPLETED | OUTPATIENT
Start: 2019-07-17 | End: 2019-07-17

## 2019-07-17 RX ORDER — OMEPRAZOLE 20 MG/1
20 CAPSULE, DELAYED RELEASE ORAL DAILY
COMMUNITY

## 2019-07-17 RX ORDER — ONDANSETRON 2 MG/ML
4 INJECTION INTRAMUSCULAR; INTRAVENOUS EVERY 4 HOURS PRN
Status: DISCONTINUED | OUTPATIENT
Start: 2019-07-17 | End: 2019-07-18 | Stop reason: HOSPADM

## 2019-07-17 RX ADMIN — SENNOSIDES, DOCUSATE SODIUM 2 TABLET: 50; 8.6 TABLET, FILM COATED ORAL at 21:37

## 2019-07-17 RX ADMIN — IOHEXOL 100 ML: 350 INJECTION, SOLUTION INTRAVENOUS at 14:10

## 2019-07-17 RX ADMIN — PREDNISONE 60 MG: 20 TABLET ORAL at 12:04

## 2019-07-17 RX ADMIN — AMIODARONE HYDROCHLORIDE 100 MG: 200 TABLET ORAL at 21:37

## 2019-07-17 RX ADMIN — MORPHINE SULFATE 4 MG: 4 INJECTION INTRAVENOUS at 12:04

## 2019-07-17 ASSESSMENT — COPD QUESTIONNAIRES
IN THE PAST 12 MONTHS DO YOU DO LESS THAN YOU USED TO BECAUSE OF YOUR BREATHING PROBLEMS: DISAGREE/UNSURE
DO YOU EVER COUGH UP ANY MUCUS OR PHLEGM?: NO/ONLY WITH OCCASIONAL COLDS OR INFECTIONS
COPD SCREENING SCORE: 2
DURING THE PAST 4 WEEKS HOW MUCH DID YOU FEEL SHORT OF BREATH: NONE/LITTLE OF THE TIME
HAVE YOU SMOKED AT LEAST 100 CIGARETTES IN YOUR ENTIRE LIFE: NO/DON'T KNOW

## 2019-07-17 ASSESSMENT — COGNITIVE AND FUNCTIONAL STATUS - GENERAL
MOBILITY SCORE: 24
DAILY ACTIVITIY SCORE: 24
SUGGESTED CMS G CODE MODIFIER MOBILITY: CH
SUGGESTED CMS G CODE MODIFIER DAILY ACTIVITY: CH

## 2019-07-17 ASSESSMENT — ENCOUNTER SYMPTOMS
VOMITING: 0
NAUSEA: 0
FALLS: 0
FEVER: 0
BACK PAIN: 1
SHORTNESS OF BREATH: 0
ABDOMINAL PAIN: 0
CONSTIPATION: 1
SPEECH CHANGE: 0
HEADACHES: 0
DIZZINESS: 0
CHILLS: 0

## 2019-07-17 ASSESSMENT — LIFESTYLE VARIABLES
EVER_SMOKED: YES
HAVE PEOPLE ANNOYED YOU BY CRITICIZING YOUR DRINKING: NO
CONSUMPTION TOTAL: NEGATIVE
EVER HAD A DRINK FIRST THING IN THE MORNING TO STEADY YOUR NERVES TO GET RID OF A HANGOVER: NO
TOTAL SCORE: 0
TOTAL SCORE: 0
HAVE YOU EVER FELT YOU SHOULD CUT DOWN ON YOUR DRINKING: NO
EVER FELT BAD OR GUILTY ABOUT YOUR DRINKING: NO
ALCOHOL_USE: YES
HOW MANY TIMES IN THE PAST YEAR HAVE YOU HAD 5 OR MORE DRINKS IN A DAY: 0
ON A TYPICAL DAY WHEN YOU DRINK ALCOHOL HOW MANY DRINKS DO YOU HAVE: 2
AVERAGE NUMBER OF DAYS PER WEEK YOU HAVE A DRINK CONTAINING ALCOHOL: 7
TOTAL SCORE: 0

## 2019-07-17 ASSESSMENT — PATIENT HEALTH QUESTIONNAIRE - PHQ9
1. LITTLE INTEREST OR PLEASURE IN DOING THINGS: NOT AT ALL
2. FEELING DOWN, DEPRESSED, IRRITABLE, OR HOPELESS: NOT AT ALL
SUM OF ALL RESPONSES TO PHQ9 QUESTIONS 1 AND 2: 0

## 2019-07-17 NOTE — ED PROVIDER NOTES
ED Provider Note    Scribed for Amanda To D.O. by Juan Mead. 7/17/2019, 10:58 AM.    Primary care provider: Nestor Jiang M.D.  Means of arrival: walk in  History obtained from: patient  History limited by: none    CHIEF COMPLAINT  Chief Complaint   Patient presents with   • Low Back Pain     Pt reports low back pain for 3 days, pt denies injury, pain to radiate out. pt denies dysuria/fever/chills. pt reporting unable to sleep or find comfort.        HPI  Rc Auguste is a 87 y.o. male who presents to the Emergency Department for mid back pain, onset a few weeks ago. He states that the pain has been so bad that he has not been able to sleeping. He has not been working in the yard, or been involved in a new activities.  He denies any falls or trauma. He endorses constipation, but denies any fever, urinary retention, chest pain, shortness of breath, nausea, vomiting, and abdominal pain. The patient states that it has been three days since he has had a bowel movement. He has been taking Norco without alleviation. He has had both knees replaced, the left one recently, in March of this year, the right more remotely in 2008.  He has had no problems since. He has an extensive medication list but states that he is only currently taking amiodarone and synthroid, at this time.  He denies taking any blood thinners.  No aspirin.  He occasionally takes Aleve..     REVIEW OF SYSTEMS  Review of Systems   Constitutional: Negative for fever.   HENT: Negative for congestion.    Respiratory: Negative for shortness of breath.    Cardiovascular: Negative for chest pain.   Gastrointestinal: Positive for constipation. Negative for abdominal pain, nausea and vomiting.   Genitourinary:        Denies urinary retention   Musculoskeletal: Positive for back pain. Negative for falls.   Neurological: Negative for dizziness, speech change and headaches.   All other systems reviewed and are negative.      PAST MEDICAL  HISTORY   has a past medical history of Arrhythmia; Back pain; Constipation; Dental disorder; and Disorder of thyroid.    SURGICAL HISTORY   has a past surgical history that includes colonoscopy - endo (4/16/2011); lumbar laminectomy diskectomy (2014); knee replacement, total (Right); other cardiac surgery; other orthopedic surgery; total knee arthroplasty (Left, 3/20/2019); gastroscopy (N/A, 4/12/2019); and colonoscopy (N/A, 4/14/2019).    SOCIAL HISTORY  Social History   Substance Use Topics   • Smoking status: Former Smoker     Packs/day: 2.00     Years: 20.00     Types: Cigarettes     Quit date: 1/1/1966   • Smokeless tobacco: Never Used   • Alcohol use 7.0 oz/week     14 Glasses of wine per week      Comment: 2/day      History   Drug Use No       FAMILY HISTORY  Family History   Problem Relation Age of Onset   • Cancer Mother         Liver CAncer   • Heart Disease Father        CURRENT MEDICATIONS  Home Medications     Reviewed by Jeannine Puentes (Pharmacy Tech) on 07/17/19 at 1458  Med List Status: Complete   Medication Last Dose Status   amiodarone (PACERONE) 100 MG tablet 7/16/2019 Active   Ascorbic Acid (VITAMIN C) 1000 MG Tab 7/16/2019 Active   CALCIUM PO 7/16/2019 Active   docosahexanoic acid (OMEGA 3) 1000 MG CAPS 7/16/2019 Active   levothyroxine (SYNTHROID) 50 MCG Tab 7/16/2019 Active   Multiple Vitamin (MULTI-VITAMIN PO) 7/16/2019 Active   omeprazole (PRILOSEC) 20 MG delayed-release capsule 7/16/2019 Active                ALLERGIES  Allergies   Allergen Reactions   • Nkda [No Known Drug Allergy]        PHYSICAL EXAM  VITAL SIGNS: /85   Pulse 85   Temp 36.3 °C (97.3 °F) (Temporal)   Resp 18   Ht 1.829 m (6')   Wt 81 kg (178 lb 9.2 oz)   SpO2 95%   BMI 24.22 kg/m²   Vitals reviewed.  Constitutional: Patient is oriented to person, place, and time. Appears well-developed and well-nourished.  Mild distress.    Head: Normocephalic and atraumatic.   Ears: Normal external ears bilaterally.    Mouth/Throat: Oropharynx is clear and moist  Eyes: Conjunctivae are normal. Pupils are equal, round, and reactive to light.   Neck: Normal range of motion. Neck supple.  Cardiovascular: Normal rate, regular rhythm and normal heart sounds. Normal peripheral pulses, bilateral lower extremities.  Pulmonary/Chest: Effort normal and breath sounds normal. No respiratory distress, no wheezes, rhonchi, or rales.   Abdominal: Soft. Bowel sounds are normal. There is no tenderness, rebound or guarding, or peritoneal signs.   Musculoskeletal/back: There is pain across his lower thoracic area.  Fairly symmetric, bilaterally.  No overlying skin changes.  No lumbar tenderness.  There is a well-healed surgical surgical wound to his low back.  No edema and no tenderness. Well-healed surgical wound to his left knee.  There is also a well-healed medial surgical wound to the right knee.  Neurological: No focal deficits.   Skin:   Skin is warm and dry. No erythema. No pallor.   Psychiatric: Patient has a normal mood and affect.     LABS  Results for orders placed or performed during the hospital encounter of 07/17/19   URINALYSIS,CULTURE IF INDICATED   Result Value Ref Range    Color Yellow     Character Clear     Specific Gravity 1.018 <1.035    Ph 6.0 5.0 - 8.0    Glucose Negative Negative mg/dL    Ketones Negative Negative mg/dL    Protein Negative Negative mg/dL    Bilirubin Negative Negative    Urobilinogen, Urine 1.0 Negative    Nitrite Negative Negative    Leukocyte Esterase Negative Negative    Occult Blood Negative Negative    Micro Urine Req see below    CBC WITH DIFFERENTIAL   Result Value Ref Range    WBC 5.4 4.8 - 10.8 K/uL    RBC 4.30 (L) 4.70 - 6.10 M/uL    Hemoglobin 13.8 (L) 14.0 - 18.0 g/dL    Hematocrit 41.4 (L) 42.0 - 52.0 %    MCV 96.3 81.4 - 97.8 fL    MCH 32.1 27.0 - 33.0 pg    MCHC 33.3 (L) 33.7 - 35.3 g/dL    RDW 51.4 (H) 35.9 - 50.0 fL    Platelet Count 195 164 - 446 K/uL    MPV 10.3 9.0 - 12.9 fL     Neutrophils-Polys 73.30 (H) 44.00 - 72.00 %    Lymphocytes 15.80 (L) 22.00 - 41.00 %    Monocytes 7.40 0.00 - 13.40 %    Eosinophils 2.20 0.00 - 6.90 %    Basophils 0.70 0.00 - 1.80 %    Immature Granulocytes 0.60 0.00 - 0.90 %    Nucleated RBC 0.00 /100 WBC    Neutrophils (Absolute) 3.98 1.82 - 7.42 K/uL    Lymphs (Absolute) 0.86 (L) 1.00 - 4.80 K/uL    Monos (Absolute) 0.40 0.00 - 0.85 K/uL    Eos (Absolute) 0.12 0.00 - 0.51 K/uL    Baso (Absolute) 0.04 0.00 - 0.12 K/uL    Immature Granulocytes (abs) 0.03 0.00 - 0.11 K/uL    NRBC (Absolute) 0.00 K/uL   COMP METABOLIC PANEL   Result Value Ref Range    Sodium 139 135 - 145 mmol/L    Potassium 4.3 3.6 - 5.5 mmol/L    Chloride 107 96 - 112 mmol/L    Co2 23 20 - 33 mmol/L    Anion Gap 9.0 0.0 - 11.9    Glucose 86 65 - 99 mg/dL    Bun 30 (H) 8 - 22 mg/dL    Creatinine 1.43 (H) 0.50 - 1.40 mg/dL    Calcium 9.7 8.5 - 10.5 mg/dL    AST(SGOT) 14 12 - 45 U/L    ALT(SGPT) 6 2 - 50 U/L    Alkaline Phosphatase 72 30 - 99 U/L    Total Bilirubin 0.6 0.1 - 1.5 mg/dL    Albumin 4.3 3.2 - 4.9 g/dL    Total Protein 8.0 6.0 - 8.2 g/dL    Globulin 3.7 (H) 1.9 - 3.5 g/dL    A-G Ratio 1.2 g/dL   ESTIMATED GFR   Result Value Ref Range    GFR If  57 (A) >60 mL/min/1.73 m 2    GFR If Non  47 (A) >60 mL/min/1.73 m 2      All labs reviewed by me.    RADIOLOGY  CT-LSPINE W/O PLUS RECONS   Final Result      Mild compression fracture of the superior endplate of T11 with minimal retropulsion. This is of indeterminate age.      Severe compression fracture of L1 with vertebral augmentation material.      Degenerative changes as above described, including facet arthropathy.      Minimal grade 1 anterolisthesis of L4 and L5.      Demineralization.      Atherosclerotic plaque.         CT-TSPINE W/O PLUS RECONS   Final Result      1.  Compression deformities are noted at the T6 and T11 levels of the thoracic spine as described above. These could be due to compression  fractures or insufficiency fractures of undetermined age.      2.  Severe compression fracture of L1 is noted containing opaque cement consistent with prior kyphoplasty.      3.  Degenerative changes are also noted as described above.      CT-ABDOMEN-PELVIS WITH   Final Result      1.  There is diverticulosis. No CT evidence of diverticulitis.      2.  4 cm cyst arises laterally from the right kidney with interval increase in size compared to the prior CT.      3.  There is calcific atherosclerosis.         MR-THORACIC SPINE-W/O    (Results Pending)   MR-LUMBAR SPINE-W/O    (Results Pending)     The radiologist's interpretation of all radiological studies have been reviewed by me.    COURSE & MEDICAL DECISION MAKING  Pertinent Labs & Imaging studies reviewed. (See chart for details)    Obtained and reviewed past medical records from April which indicated that he was admitted for a GI bleed. He was also anticoagulated for A-fib. He also required a blood transfusion. In March he had total knee replacement. He has no recent imaging of his back.      10:58 AM - Patient seen and examined at bedside.  This is a very pleasant 87-year-old male.  Has been been quite active throughout his life, he skied for many years until very recently.  He presents with pain across his thoracic area without inciting event.  Denies any recent trauma or new activities.  Patient will be treated with morphine 4 mg and deltasone 60 mg. Ordered UA, CMP, CBC with differential, estimated GFR, ct-abdomen, ct-Lspine, and ct-Tspine to evaluate his symptoms.     The differential diagnoses include but are not limited to: OA, UTI, and aortic pathology, less likely kidney stone.    Patient's imaging reviewed.  Will contact neurosurgery.    2:28 PM Neurosurgery was paged at this time.     2:30 PM Patient was reevaluated at this time.  Patient reports having increased pain laying flat for CT and this is what it seems to be the worst.  Over otherwise, after  being medicated, he is feeling better.  We discussed CT findings consistent with compression fractures of T6 and T11.  He is aware, I have contacted neurosurgery and awaiting their response.  We will update him after our conversation.    2:43 PM I discussed the patient's case and the above findings with Dr. Lynn (Neurosurgery) who looked at his ct and believes that he is a candidate for a kyphoplasty. He would him admitted and to have an mri or thoracic and lumbar spin.     2:46 PM I updated the patient on consult with Neurosurgery as seen above.  He is agreeable to possible surgical intervention that may help him.  He understands he will undergo MRI of his thoracic and lumbar spine today.  He will be admitted to the hospital for possible surgical procedure tomorrow.  Patient verbally understands and agrees to plan of care.     2:51 PM I discussed the patient's case and the above findings with Dr. Grajeda (hospitalist), who is in the department and  agrees to admit the patient.   MRI of the thoracic and lumbar spine is been ordered.    DISPOSITION:  Patient will be admitted to Dr. Grajeda in guarded condition.      FINAL IMPRESSION  1. Compression fracture of body of thoracic vertebra (HCC)    2. Bilateral thoracic back pain, unspecified chronicity          Juan REYES (Scribe), am scribing for, and in the presence of, Amanda To D.O..    Electronically signed by: Juan Mead (Scribe), 7/17/2019    IAmanda D.O. personally performed the services described in this documentation, as scribed by Juan Mead in my presence, and it is both accurate and complete.  C  The note accurately reflects work and decisions made by me.  Amanda To  7/17/2019  4:08 PM

## 2019-07-17 NOTE — ED TRIAGE NOTES
Chief Complaint   Patient presents with   • Low Back Pain     Pt reports low back pain for 3 days, pt denies injury, pain to radiate out. pt denies dysuria/fever/chills. pt reporting unable to sleep or find comfort.      Explained to pt triage process, made pt aware to tell this RN/staff of any changes/concerns, pt verbalized understanding of process and instructions given. Pt to ER estiven.

## 2019-07-18 ENCOUNTER — APPOINTMENT (OUTPATIENT)
Dept: RADIOLOGY | Facility: MEDICAL CENTER | Age: 84
End: 2019-07-18
Attending: NEUROLOGICAL SURGERY
Payer: COMMERCIAL

## 2019-07-18 ENCOUNTER — ANESTHESIA (OUTPATIENT)
Dept: SURGERY | Facility: MEDICAL CENTER | Age: 84
End: 2019-07-18
Payer: COMMERCIAL

## 2019-07-18 ENCOUNTER — ANESTHESIA EVENT (OUTPATIENT)
Dept: SURGERY | Facility: MEDICAL CENTER | Age: 84
End: 2019-07-18
Payer: COMMERCIAL

## 2019-07-18 VITALS
DIASTOLIC BLOOD PRESSURE: 60 MMHG | RESPIRATION RATE: 18 BRPM | OXYGEN SATURATION: 95 % | WEIGHT: 178.57 LBS | TEMPERATURE: 97.3 F | HEART RATE: 71 BPM | HEIGHT: 72 IN | SYSTOLIC BLOOD PRESSURE: 103 MMHG | BODY MASS INDEX: 24.19 KG/M2

## 2019-07-18 LAB
APTT PPP: 33 SEC (ref 24.7–36)
EKG IMPRESSION: NORMAL
INR PPP: 1.06 (ref 0.87–1.13)
PATHOLOGY CONSULT NOTE: NORMAL
PROTHROMBIN TIME: 14.1 SEC (ref 12–14.6)

## 2019-07-18 PROCEDURE — L8699 PROSTHETIC IMPLANT NOS: HCPCS | Performed by: ORTHOPAEDIC SURGERY

## 2019-07-18 PROCEDURE — 88307 TISSUE EXAM BY PATHOLOGIST: CPT

## 2019-07-18 PROCEDURE — 700102 HCHG RX REV CODE 250 W/ 637 OVERRIDE(OP): Performed by: ANESTHESIOLOGY

## 2019-07-18 PROCEDURE — 160002 HCHG RECOVERY MINUTES (STAT): Performed by: ORTHOPAEDIC SURGERY

## 2019-07-18 PROCEDURE — A9270 NON-COVERED ITEM OR SERVICE: HCPCS | Performed by: HOSPITALIST

## 2019-07-18 PROCEDURE — 93005 ELECTROCARDIOGRAM TRACING: CPT | Performed by: HOSPITALIST

## 2019-07-18 PROCEDURE — 85610 PROTHROMBIN TIME: CPT

## 2019-07-18 PROCEDURE — 160035 HCHG PACU - 1ST 60 MINS PHASE I: Performed by: ORTHOPAEDIC SURGERY

## 2019-07-18 PROCEDURE — 160039 HCHG SURGERY MINUTES - EA ADDL 1 MIN LEVEL 3: Performed by: ORTHOPAEDIC SURGERY

## 2019-07-18 PROCEDURE — 700111 HCHG RX REV CODE 636 W/ 250 OVERRIDE (IP): Performed by: ANESTHESIOLOGY

## 2019-07-18 PROCEDURE — 700111 HCHG RX REV CODE 636 W/ 250 OVERRIDE (IP): Performed by: HOSPITALIST

## 2019-07-18 PROCEDURE — 160036 HCHG PACU - EA ADDL 30 MINS PHASE I: Performed by: ORTHOPAEDIC SURGERY

## 2019-07-18 PROCEDURE — 85730 THROMBOPLASTIN TIME PARTIAL: CPT

## 2019-07-18 PROCEDURE — 36415 COLL VENOUS BLD VENIPUNCTURE: CPT

## 2019-07-18 PROCEDURE — 700102 HCHG RX REV CODE 250 W/ 637 OVERRIDE(OP): Performed by: HOSPITALIST

## 2019-07-18 PROCEDURE — 72080 X-RAY EXAM THORACOLMB 2/> VW: CPT

## 2019-07-18 PROCEDURE — 160048 HCHG OR STATISTICAL LEVEL 1-5: Performed by: ORTHOPAEDIC SURGERY

## 2019-07-18 PROCEDURE — G0378 HOSPITAL OBSERVATION PER HR: HCPCS

## 2019-07-18 PROCEDURE — A9270 NON-COVERED ITEM OR SERVICE: HCPCS | Performed by: ANESTHESIOLOGY

## 2019-07-18 PROCEDURE — A6402 STERILE GAUZE <= 16 SQ IN: HCPCS | Performed by: ORTHOPAEDIC SURGERY

## 2019-07-18 PROCEDURE — 160009 HCHG ANES TIME/MIN: Performed by: ORTHOPAEDIC SURGERY

## 2019-07-18 PROCEDURE — 700101 HCHG RX REV CODE 250: Performed by: ORTHOPAEDIC SURGERY

## 2019-07-18 PROCEDURE — 700105 HCHG RX REV CODE 258: Performed by: ANESTHESIOLOGY

## 2019-07-18 PROCEDURE — 88311 DECALCIFY TISSUE: CPT

## 2019-07-18 PROCEDURE — 700101 HCHG RX REV CODE 250: Performed by: ANESTHESIOLOGY

## 2019-07-18 PROCEDURE — 99217 PR OBSERVATION CARE DISCHARGE: CPT | Performed by: INTERNAL MEDICINE

## 2019-07-18 PROCEDURE — 700117 HCHG RX CONTRAST REV CODE 255: Performed by: ORTHOPAEDIC SURGERY

## 2019-07-18 PROCEDURE — 502240 HCHG MISC OR SUPPLY RC 0272: Performed by: ORTHOPAEDIC SURGERY

## 2019-07-18 PROCEDURE — 93010 ELECTROCARDIOGRAM REPORT: CPT | Performed by: INTERNAL MEDICINE

## 2019-07-18 PROCEDURE — 160028 HCHG SURGERY MINUTES - 1ST 30 MINS LEVEL 3: Performed by: ORTHOPAEDIC SURGERY

## 2019-07-18 DEVICE — BONE CEMENT & MIXER FOR KYPHO: Type: IMPLANTABLE DEVICE | Site: BACK | Status: FUNCTIONAL

## 2019-07-18 RX ORDER — HALOPERIDOL 5 MG/ML
1 INJECTION INTRAMUSCULAR
Status: CANCELLED | OUTPATIENT
Start: 2019-07-18

## 2019-07-18 RX ORDER — SODIUM CHLORIDE, SODIUM LACTATE, POTASSIUM CHLORIDE, CALCIUM CHLORIDE 600; 310; 30; 20 MG/100ML; MG/100ML; MG/100ML; MG/100ML
INJECTION, SOLUTION INTRAVENOUS CONTINUOUS
Status: CANCELLED | OUTPATIENT
Start: 2019-07-18

## 2019-07-18 RX ORDER — KETOROLAC TROMETHAMINE 30 MG/ML
INJECTION, SOLUTION INTRAMUSCULAR; INTRAVENOUS PRN
Status: DISCONTINUED | OUTPATIENT
Start: 2019-07-18 | End: 2019-07-18 | Stop reason: SURG

## 2019-07-18 RX ORDER — CELECOXIB 200 MG/1
CAPSULE ORAL
Status: COMPLETED
Start: 2019-07-18 | End: 2019-07-18

## 2019-07-18 RX ORDER — GABAPENTIN 300 MG/1
CAPSULE ORAL
Status: COMPLETED
Start: 2019-07-18 | End: 2019-07-18

## 2019-07-18 RX ORDER — OXYCODONE HCL 5 MG/5 ML
5 SOLUTION, ORAL ORAL
Status: CANCELLED | OUTPATIENT
Start: 2019-07-18

## 2019-07-18 RX ORDER — OXYCODONE HYDROCHLORIDE 5 MG/1
5 TABLET ORAL EVERY 4 HOURS PRN
Qty: 24 TAB | Refills: 0 | Status: SHIPPED | OUTPATIENT
Start: 2019-07-18 | End: 2019-07-22

## 2019-07-18 RX ORDER — CELECOXIB 100 MG/1
CAPSULE ORAL PRN
Status: DISCONTINUED | OUTPATIENT
Start: 2019-07-18 | End: 2019-07-18 | Stop reason: SURG

## 2019-07-18 RX ORDER — CEFAZOLIN SODIUM 1 G/3ML
INJECTION, POWDER, FOR SOLUTION INTRAMUSCULAR; INTRAVENOUS PRN
Status: DISCONTINUED | OUTPATIENT
Start: 2019-07-18 | End: 2019-07-18 | Stop reason: SURG

## 2019-07-18 RX ORDER — DEXAMETHASONE SODIUM PHOSPHATE 4 MG/ML
INJECTION, SOLUTION INTRA-ARTICULAR; INTRALESIONAL; INTRAMUSCULAR; INTRAVENOUS; SOFT TISSUE PRN
Status: DISCONTINUED | OUTPATIENT
Start: 2019-07-18 | End: 2019-07-18 | Stop reason: SURG

## 2019-07-18 RX ORDER — ONDANSETRON 2 MG/ML
4 INJECTION INTRAMUSCULAR; INTRAVENOUS
Status: CANCELLED | OUTPATIENT
Start: 2019-07-18

## 2019-07-18 RX ORDER — OXYCODONE HCL 5 MG/5 ML
10 SOLUTION, ORAL ORAL
Status: CANCELLED | OUTPATIENT
Start: 2019-07-18

## 2019-07-18 RX ORDER — HYDROMORPHONE HYDROCHLORIDE 1 MG/ML
0.1 INJECTION, SOLUTION INTRAMUSCULAR; INTRAVENOUS; SUBCUTANEOUS
Status: CANCELLED | OUTPATIENT
Start: 2019-07-18

## 2019-07-18 RX ORDER — HYDROMORPHONE HYDROCHLORIDE 1 MG/ML
0.2 INJECTION, SOLUTION INTRAMUSCULAR; INTRAVENOUS; SUBCUTANEOUS
Status: CANCELLED | OUTPATIENT
Start: 2019-07-18

## 2019-07-18 RX ORDER — HYDROMORPHONE HYDROCHLORIDE 1 MG/ML
0.4 INJECTION, SOLUTION INTRAMUSCULAR; INTRAVENOUS; SUBCUTANEOUS
Status: CANCELLED | OUTPATIENT
Start: 2019-07-18

## 2019-07-18 RX ORDER — BUPIVACAINE HYDROCHLORIDE AND EPINEPHRINE 5; 5 MG/ML; UG/ML
INJECTION, SOLUTION EPIDURAL; INTRACAUDAL; PERINEURAL
Status: DISCONTINUED | OUTPATIENT
Start: 2019-07-18 | End: 2019-07-18 | Stop reason: HOSPADM

## 2019-07-18 RX ORDER — GABAPENTIN 300 MG/1
CAPSULE ORAL PRN
Status: DISCONTINUED | OUTPATIENT
Start: 2019-07-18 | End: 2019-07-18 | Stop reason: SURG

## 2019-07-18 RX ORDER — DIPHENHYDRAMINE HYDROCHLORIDE 50 MG/ML
12.5 INJECTION INTRAMUSCULAR; INTRAVENOUS
Status: CANCELLED | OUTPATIENT
Start: 2019-07-18

## 2019-07-18 RX ORDER — SODIUM CHLORIDE, SODIUM LACTATE, POTASSIUM CHLORIDE, CALCIUM CHLORIDE 600; 310; 30; 20 MG/100ML; MG/100ML; MG/100ML; MG/100ML
INJECTION, SOLUTION INTRAVENOUS
Status: DISCONTINUED | OUTPATIENT
Start: 2019-07-18 | End: 2019-07-18 | Stop reason: SURG

## 2019-07-18 RX ORDER — ACETAMINOPHEN 500 MG
TABLET ORAL
Status: COMPLETED
Start: 2019-07-18 | End: 2019-07-18

## 2019-07-18 RX ORDER — MEPERIDINE HYDROCHLORIDE 25 MG/ML
6.25 INJECTION INTRAMUSCULAR; INTRAVENOUS; SUBCUTANEOUS
Status: CANCELLED | OUTPATIENT
Start: 2019-07-18

## 2019-07-18 RX ADMIN — SODIUM CHLORIDE, POTASSIUM CHLORIDE, SODIUM LACTATE AND CALCIUM CHLORIDE: 600; 310; 30; 20 INJECTION, SOLUTION INTRAVENOUS at 14:23

## 2019-07-18 RX ADMIN — FENTANYL CITRATE 100 MCG: 50 INJECTION, SOLUTION INTRAMUSCULAR; INTRAVENOUS at 14:26

## 2019-07-18 RX ADMIN — ONDANSETRON 4 MG: 2 INJECTION INTRAMUSCULAR; INTRAVENOUS at 14:26

## 2019-07-18 RX ADMIN — KETOROLAC TROMETHAMINE 30 MG: 30 INJECTION, SOLUTION INTRAMUSCULAR at 14:45

## 2019-07-18 RX ADMIN — PROPOFOL 150 MG: 10 INJECTION, EMULSION INTRAVENOUS at 14:26

## 2019-07-18 RX ADMIN — ROCURONIUM BROMIDE 30 MG: 10 INJECTION, SOLUTION INTRAVENOUS at 14:26

## 2019-07-18 RX ADMIN — SUCCINYLCHOLINE CHLORIDE 200 MG: 20 INJECTION, SOLUTION INTRAMUSCULAR; INTRAVENOUS at 14:26

## 2019-07-18 RX ADMIN — ACETAMINOPHEN 1000 MG: 325 TABLET, FILM COATED ORAL at 14:16

## 2019-07-18 RX ADMIN — DEXAMETHASONE SODIUM PHOSPHATE 4 MG: 4 INJECTION, SOLUTION INTRA-ARTICULAR; INTRALESIONAL; INTRAMUSCULAR; INTRAVENOUS; SOFT TISSUE at 14:26

## 2019-07-18 RX ADMIN — LIDOCAINE HYDROCHLORIDE 50 MG: 20 INJECTION, SOLUTION INTRAVENOUS at 14:26

## 2019-07-18 RX ADMIN — CELECOXIB 200 MG: 100 CAPSULE ORAL at 14:16

## 2019-07-18 RX ADMIN — GABAPENTIN 300 MG: 300 CAPSULE ORAL at 14:16

## 2019-07-18 RX ADMIN — CEFAZOLIN 2 G: 330 INJECTION, POWDER, FOR SOLUTION INTRAMUSCULAR; INTRAVENOUS at 14:23

## 2019-07-18 ASSESSMENT — ENCOUNTER SYMPTOMS
EYE PAIN: 0
FEVER: 0
NECK PAIN: 0
COUGH: 0
VOMITING: 0
PALPITATIONS: 0
DIZZINESS: 0
BACK PAIN: 1
NAUSEA: 0
SHORTNESS OF BREATH: 0
HEADACHES: 0
EYE REDNESS: 0
CHILLS: 0

## 2019-07-18 ASSESSMENT — PAIN SCALES - GENERAL: PAIN_LEVEL: 2

## 2019-07-18 NOTE — OR SURGEON
Immediate Post OP Note    PreOp Diagnosis: t11 compression fx subacute    PostOp Diagnosis: same    Procedure(s):  KYPHOPLASTY-T11 - Wound Class: Clean    Surgeon(s):  Yovani Sainz M.D.    Anesthesiologist/Type of Anesthesia:  Anesthesiologist: Sam Mcgrath D.O./General    Surgical Staff:  Assistant: ALICIA Ngo  Circulator: Natan Reyes R.N.  Scrub Person: Bell Chase  Radiology Technologist: Syeda Bose    Specimens removed if any:      Estimated Blood Loss: 3cc    Findings: none    Complications: none        7/18/2019 3:01 PM Yovani Sainz M.D.

## 2019-07-18 NOTE — ADDENDUM NOTE
Addendum  created 07/18/19 1515 by Sam Mcgrath D.O.    Anesthesia Intra Meds edited, Order list changed, Order sets accessed

## 2019-07-18 NOTE — CARE PLAN
Problem: Safety  Goal: Will remain free from falls    Intervention: Implement fall precautions   07/18/19 0306   OTHER   Environmental Precautions Treaded Slipper Socks on Patient;Personal Belongings, Wastebasket, Call Bell etc. in Easy Reach;Transferred to Stronger Side;Report Given to Other Health Care Providers Regarding Fall Risk;Bed in Low Position;Communication Sign for Patients & Families   Encouraged to call for assistance.      Problem: Knowledge Deficit  Goal: Knowledge of disease process/condition, treatment plan, diagnostic tests, and medications will improve    Intervention: Explain information regarding disease process/condition, treatment plan, diagnostic tests, and medications and document in education  Pt and family discussed treatment after s/e by Hospitalist and consulted by Neurosurgery, verbalized understanding.

## 2019-07-18 NOTE — PROGRESS NOTES
Received alert and oriented x 4. Check vitals sign and recorded accordingly and due med given per MAR. Monitor sign and symptoms of respiratory distress and treatment given accordingly per MAR.Medicated per MAR and reassessed every 2 hours per protocol. NO pain/disocmfort at this moment. S/e by Dr. Grajeda and Dr. Lynn. Call light within reach. Steady on his feet. Instructed NPO at midnight for possible Kypoplasty tomorrow. Needs attended. Will continue to monitor.BP (!) 162/91   Pulse 82   Temp 36.4 °C (97.6 °F) (Temporal)   Resp 17   Ht 1.829 m (6')   Wt 81 kg (178 lb 9.2 oz)   SpO2 96%   BMI 24.22 kg/m² .

## 2019-07-18 NOTE — ANESTHESIA POSTPROCEDURE EVALUATION
Patient: Rc Auguste    Procedure Summary     Date:  07/18/19 Room / Location:  [facility] 04 / SURGERY [facility]    Anesthesia Start:  1423 Anesthesia Stop:  1509    Procedure:  KYPHOPLASTY-T11 (Back) Diagnosis:  (T11 compression fracture)    Surgeon:  Yovani Sainz M.D. Responsible Provider:  Sam Mcgrath D.O.    Anesthesia Type:  general ASA Status:  2          Final Anesthesia Type: general  Last vitals  BP   Blood Pressure : 137/84, NIBP: 156/85    Temp   36.2 °C (97.2 °F)    Pulse   Pulse: 69   Resp   16    SpO2   96 %      Anesthesia Post Evaluation    Patient location during evaluation: bedside  Patient participation: complete - patient participated  Level of consciousness: awake and alert  Pain score: 2    Airway patency: patent  Anesthetic complications: no  Cardiovascular status: adequate  Respiratory status: acceptable  Hydration status: acceptable    PONV: none           Nurse Pain Score: 0 (NPRS)

## 2019-07-18 NOTE — CARE PLAN
Problem: Safety  Goal: Will remain free from falls  Outcome: PROGRESSING AS EXPECTED  Fall precautions in place, frequent rounding started, call light within reach

## 2019-07-18 NOTE — H&P
"Hospital Medicine History & Physical Note    Date of Service  7/17/2019    Primary Care Physician  Nestor Jiang M.D.    Consultants  Dr. Lynn, neurosurgery, was called from the ER    Code Status  full    Chief Complaint  Back pain    History of Presenting Illness  87 y.o. male who presented 7/17/2019 with intractable back pain. Mr. Malloy a past medical history of atrial fibrillation not on anticoagulation therapy is most recently admitted 4/11/2019 with anemia from resumptive upper GI bleed.  He states that about a month ago, he developed mid back pain that has been worsening.  He states he did not have any trauma and denies falls.  Dates the pain has been incapacitating is not able to sleep at night.  The pain is across the mid back radiates around both sides.  He states \"that the pain is ferocious\".  His back pain has become unbearable therefore he presented the emergency room where a CAT scan reveals a compression fracture of T6 and T11.  Neurosurgery was consulted from the emergency room for possible kyphoplasty or lumboplasty.   Mr. Auguste also notes that he has been constipated and has started taking Aleve again which she had stopped taking after his recent GI bleed.  Will be admitted for pain control and neurosurgical consultation.    Review of Systems  Review of Systems   Constitutional: Negative for chills and fever.   Gastrointestinal: Positive for constipation.   Genitourinary:        No loss of bowel or bladder function   Musculoskeletal: Positive for back pain. Negative for falls.   Neurological:        No weakness of his legs and no decreased sensation   All other systems reviewed and are negative.      Past Medical History   has a past medical history of Arrhythmia; Back pain; Constipation; Dental disorder; and Disorder of thyroid.  Upper GI bleed for which she went EGD and colonoscopy April 2011  Paroxysmal atrial fibrillation on amiodarone therapy not on anticoagulation    Surgical " History   has a past surgical history that includes colonoscopy - endo (4/16/2011); lumbar laminectomy diskectomy (2014); knee replacement, total (Right); other cardiac surgery; other orthopedic surgery; pr total knee arthroplasty (Left, 3/20/2019); gastroscopy (N/A, 4/12/2019); and colonoscopy (N/A, 4/14/2019).     Family History  family history includes Cancer in his mother; Heart Disease in his father.     Social History   reports that he quit smoking about 53 years ago. His smoking use included Cigarettes. He has a 40.00 pack-year smoking history. He has never used smokeless tobacco. He reports that he drinks about 7.0 oz of alcohol per week . He reports that he does not use drugs.  He lives with his wife    Allergies  Allergies   Allergen Reactions   • Nkda [No Known Drug Allergy]        Medications  Prior to Admission Medications   Prescriptions Last Dose Informant Patient Reported? Taking?   Ascorbic Acid (VITAMIN C) 1000 MG Tab 7/16/2019 at Unknown time  Yes Yes   Sig: Take 1,000 mg by mouth every morning.   CALCIUM PO 7/16/2019 at Unknown time Family Member Yes No   Sig: Take 1 Tab by mouth every morning.   Multiple Vitamin (MULTI-VITAMIN PO) 7/16/2019 at Unknown time Family Member Yes No   Sig: Take 1 Tab by mouth every day.   amiodarone (PACERONE) 100 MG tablet 7/16/2019 at Unknown time Patient's Home Pharmacy Yes No   Sig: Take 100 mg by mouth every bedtime.   docosahexanoic acid (OMEGA 3) 1000 MG CAPS 7/16/2019 at Unknown time Family Member Yes No   Sig: Take 1,000 mg by mouth every morning.   levothyroxine (SYNTHROID) 50 MCG Tab 7/16/2019 at Unknown time Patient's Home Pharmacy Yes No   Sig: Take 50 mcg by mouth Every morning on an empty stomach.   omeprazole (PRILOSEC) 20 MG delayed-release capsule 7/16/2019 at Unknown time  Yes Yes   Sig: Take 20 mg by mouth every day.      Facility-Administered Medications: None       Physical Exam  Temp:  [36.3 °C (97.3 °F)] 36.3 °C (97.3 °F)  Pulse:  [72-85]  72  Resp:  [18] 18  BP: (135)/(85) 135/85  SpO2:  [95 %-99 %] 99 %    Physical Exam   Constitutional: He is oriented to person, place, and time. No distress.   HENT:   Head: Normocephalic and atraumatic.   Eyes: Right eye exhibits no discharge. Left eye exhibits no discharge. No scleral icterus.   Neck: Normal range of motion. Neck supple.   Cardiovascular: Normal rate and regular rhythm.    No murmur heard.  Pulmonary/Chest: Effort normal. No respiratory distress. He has no wheezes.   Abdominal: He exhibits distension. There is no tenderness.   Musculoskeletal: He exhibits no edema or tenderness.   Well-healed scars on both his knees   Neurological: He is alert and oriented to person, place, and time.   Skin: Skin is warm and dry. No rash noted. He is not diaphoretic.   Psychiatric: He has a normal mood and affect. His behavior is normal.   Nursing note and vitals reviewed.      Laboratory:  Recent Labs      07/17/19   1210   WBC  5.4   RBC  4.30*   HEMOGLOBIN  13.8*   HEMATOCRIT  41.4*   MCV  96.3   MCH  32.1   MCHC  33.3*   RDW  51.4*   PLATELETCT  195   MPV  10.3     Recent Labs      07/17/19   1210   SODIUM  139   POTASSIUM  4.3   CHLORIDE  107   CO2  23   GLUCOSE  86   BUN  30*   CREATININE  1.43*   CALCIUM  9.7     Recent Labs      07/17/19   1210   ALTSGPT  6   ASTSGOT  14   ALKPHOSPHAT  72   TBILIRUBIN  0.6   GLUCOSE  86         No results for input(s): NTPROBNP in the last 72 hours.      No results for input(s): TROPONINT in the last 72 hours.    Urinalysis:    Recent Labs      07/17/19   1209   SPECGRAVITY  1.018   GLUCOSEUR  Negative   KETONES  Negative   NITRITE  Negative   LEUKESTERAS  Negative        Imaging:  MR-THORACIC SPINE-W/O   Final Result      1.  There is mild acute compression fracture along the superior endplate of T11 vertebral body. This fracture is amenable for vertebral augmentation procedure.   2.  Mild chronic compression deformity at T6.   3.  Severe chronic compression deformity  with previous vertebral augmentation procedure at L1.      MR-LUMBAR SPINE-W/O   Final Result      1.  Acute compression fracture along the superior endplate of T11 vertebral body. This fracture is amenable for vertebral augmentation procedure.   2.  There are changes secondary to the previous vertebral augmentation procedure L1 compression fracture.   3.  Severe degenerative disease in the lumbar spine as described above.   4.  Severe central canal stenosis at L4-5.   5.  Moderate central canal stenosis at L2-3 and L3-4.      CT-LSPINE W/O PLUS RECONS   Final Result      Mild compression fracture of the superior endplate of T11 with minimal retropulsion. This is of indeterminate age.      Severe compression fracture of L1 with vertebral augmentation material.      Degenerative changes as above described, including facet arthropathy.      Minimal grade 1 anterolisthesis of L4 and L5.      Demineralization.      Atherosclerotic plaque.         CT-TSPINE W/O PLUS RECONS   Final Result      1.  Compression deformities are noted at the T6 and T11 levels of the thoracic spine as described above. These could be due to compression fractures or insufficiency fractures of undetermined age.      2.  Severe compression fracture of L1 is noted containing opaque cement consistent with prior kyphoplasty.      3.  Degenerative changes are also noted as described above.      CT-ABDOMEN-PELVIS WITH   Final Result      1.  There is diverticulosis. No CT evidence of diverticulitis.      2.  4 cm cyst arises laterally from the right kidney with interval increase in size compared to the prior CT.      3.  There is calcific atherosclerosis.               Assessment/Plan:  I anticipate this patient is appropriate for observation status at this time.    * Non-traumatic compression fracture of T11 thoracic vertebra (HCC)- (present on admission)   Assessment & Plan    This is noted on CAT scan and MRI of the spine  This is extremely  symptomatic and is failed outpatient management with oral pain medications  Dr. Lynn, neurosurgery, was called from the emergency room and will consult for possible kyphoplasty thus Mr. Auguste will be n.p.o. at midnight  Oral and IV pain medications have been ordered  After either kyphoplasty or lumboplasty, physical therapy and Occupational Therapy will be ordered     Non-traumatic compression fracture of T6 thoracic vertebra (HCC)- (present on admission)   Assessment & Plan    As noted above      Constipation- (present on admission)   Assessment & Plan    Bowel protocol will be initiated     Paroxysmal atrial fibrillation (HCC)- (present on admission)   Assessment & Plan    Continue amiodarone  He is not on anticoagulation in the setting of recent GI bleed.     Acquired hypothyroidism- (present on admission)   Assessment & Plan    Continue synthroid         VTE prophylaxis: SCDs

## 2019-07-18 NOTE — PROGRESS NOTES
Received alert and oriented x 4. Check vitals sign and recorded accordingly and due med given per MAR. Monitor sign and symptoms of respiratory distress and treatment given accordingly per MAR.Medicated per MAR and reassessed every 2 hours per protocol. NO pain/disocmfort at this moment. S/e by Dr. Grajeda and Dr. Lynn. Call light within reach. Steady on his feet. Instructed NPO at midnight for possible Kypoplasty today. Needs attended. Will continue to monitor.

## 2019-07-18 NOTE — OP REPORT
DATE OF SERVICE:  07/18/2019    SERVICE:  Orthopedic spine surgery.    POSTOPERATIVE DIAGNOSES:  1.  T11 subacute compression fracture.  2.  Osteoporosis.  3.  Pathologic fracture, T11.    POSTOPERATIVE DIAGNOSES:  1.  T11 subacute compression fracture.  2.  Osteoporosis.  3.  Pathologic fracture, T11.    PROCEDURES PERFORMED:  1.  T11 surgical reduction and internal fixation of subacute compression   fracture using Kyphon inflatable balloon tamps 20 mm (kyphoplasty).  2.  Deep vertebral biopsy T11.  3.  Greater than 1 hour use of operating room fluoroscopy.    SURGEON:  Yovani Sainz MD    ASSISTANT SURGEON:  None.    ANESTHETIC:  General.    ANESTHESIOLOGIST:  Sam Mcgrath DO    COMPLICATIONS:  None.    BLOOD LOSS:  3 mL    SPECIMEN:  Biopsy, T11.    DESCRIPTION OF PROCEDURE:  After informed consent was obtained, the patient   was brought to the operating room and given general endotracheal anesthetic.    He was placed supine on the operating room table after IV Ancef had been   given.  After the field was draped, a time-out was called correctly   identifying the patient and the procedure to be done.  We then brought in the   AP and lateral C-arms and visualized the T11 vertebral body.  A small incision   was made to the right of midline at the level of T11 and a Jamshidi was   advanced to the 1:30 position of the pedicle on the AP.  The Jamshidi was then   advanced to the medial border of the pedicle on the AP at the same time as it   was at the posterior margin of the vertebral body on the lateral.  The center   of the Jamshidi was then removed.  A bone biopsy cannula was then placed deep   within the vertebral body and a bone biopsy was harvested for permanent   section evaluation.  A drill and bone void filler were then used to create a   path for the balloon.  A 20 mm balloon was then placed and inflated to 44 PSI.    The guide pin was then removed from the balloon.    The same sequence of events was  then done on the left side except the biopsy   was not taken.  Once both balloons were in place, we used sequentially higher   pressures and created a cavity and placed pressure on the superior endplate   achieving some elevation/reduction.    Once excellent expansion of balloons was achieved, we then mixed cement.  Both   balloons were then removed and we then placed cement under low pressure using   live direct fluoroscopic visualization during insertion of the cement.    Excellent expansion of cement was achieved.  Cement was then allowed to harden   and all instrumentation was removed.  The wounds were closed with benzoin and   Steri-Strips.  We did inject 20 mL of 0.5% Marcaine with epinephrine into the   wounds as local anesthetic.  Wound dressings were applied.  The procedure was   terminated.  No complications were experienced.  Blood loss was 3 mL.       ____________________________________     MD THIAGO NUNO / ANTONIO    DD:  07/18/2019 15:05:48  DT:  07/18/2019 16:36:54    D#:  [id number]  Job#:  [id number]

## 2019-07-18 NOTE — PROGRESS NOTES
Dr. Yovani Sainz, [facility], kindly agreeed to assume patient's care for T11 compression fracture. Dr. Willy Lynn will sign off.

## 2019-07-18 NOTE — ASSESSMENT & PLAN NOTE
This is noted on CAT scan and MRI of the spine  This is extremely symptomatic and is failed outpatient management with oral pain medications  Neurosurgery following and planing for kyphoplasty today appreciate rec.   Cont on pain management   PT/Ot after kyphoplasty

## 2019-07-18 NOTE — PROGRESS NOTES
Pt seen and examined, NAD doing well, no changes or issues over night     PE:   AOx3, NAD  Elaine, SLTI x4  Good strength in all 4  CNII-XII grossly intact     A/P:   T11 compression fracture    Continue NPO for planned kyphoplasty today  I cannot perform today due to scheduling conflicts, however the patient is open to another surgeon performing kypho today and so will be done.  Pain control

## 2019-07-18 NOTE — ANESTHESIA TIME REPORT
Anesthesia Start and Stop Event Times     Date Time Event    7/18/2019 1423 Anesthesia Start     1509 Anesthesia Stop        Responsible Staff  07/18/19    Name Role Begin End    Sam Mcgrath D.O. Anesth 1423 1509        Preop Diagnosis (Free Text):  Pre-op Diagnosis     T11 compression fracture        Preop Diagnosis (Codes):  Diagnosis Information     Diagnosis Code(s):         Post op Diagnosis  Closed wedge compression fracture of T11 vertebra (HCC)  back pain    Premium Reason  A. 3PM - 7AM    Comments:

## 2019-07-18 NOTE — PROGRESS NOTES
· 2 RN skin check complete with Clotilde RN  · Devices in place PIV.  · Skin assessed under devices yes.  · Confirmed pressure ulcers found on n/a.  · New potential pressure ulcers noted on n/a.   · The following interventions are in place pt turns self frequently and pillows for support and repositioning.  · Scattered bruising to extremities. Dry scab to top of forehead. Sacrum red/blanching.

## 2019-07-18 NOTE — OR NURSING
Patient A+Ox4. Denies pain or nausea.  Given box lunch and able to tolerate 50%.  VSS. Dressing to mid back clean and dry.  Plan for patient to transfer back to [facility].  Report to Jhoan MATUTE.  Patient states he does not have family here today to update

## 2019-07-18 NOTE — CONSULTS
DATE OF SERVICE:  07/18/2019    SERVICE:  Orthopedic spine surgery.    CONSULTANT:  Yovani Sainz MD    PERSON WHO ASKED FOR CONSULT:  Dr. Sam Grey and Dr. Gabriel Grajeda.    HISTORY OF PRESENT ILLNESS:  The patient is approximately 87-year-old man with   severe intractable back pain who presented to the hospital yesterday.  He   does have a past medical history consistent with osteoporosis.    His wife reports that he had an L1 kyphoplasty approximately 5 years ago and   she believes that I was the surgeon who performed this procedure.  She does   report it was definitively successful.     He currently reports his lower thoracic and upper lumbar pain to be present   for several months.  He reports that at times it is incapacitating.  It is so   severe it brought him to the emergency room for evaluation.    He was first seen by Dr. Sam Grey's group, kyphoplasty was planned;   however, due to scheduling difficulties, they were unable to do the procedure.    He does not report any symptoms that are radiculopathic in nature.  No   numbness, tingling, weakness, or any other radiculopathic symptoms.    PAST MEDICAL HISTORY:  Significant for cardiac arrhythmia, constipation,   dental disease, thyroid disease, upper GI bleed, paroxysmal atrial   fibrillation.    PAST SURGICAL HISTORY:  L1 kyphoplasty, lumbar laminectomy and diskectomy,   colonoscopy, bilateral total knee replacements, gastroscopy, colonoscopy.    SOCIAL HISTORY:  He does not use tobacco.  He drinks approximately 7 ounces of   alcohol per week.  He is currently retired.  He is accompanied by his wife.    REVIEW OF SYSTEMS:  Positive for constipation, back pain.    ALLERGIES:  No known drug allergies.    MEDICATIONS:  Vitamin C, calcium, ____, amiodarone, hexanoic acid,   levothyroxine, omeprazole.    PHYSICAL EXAMINATION:  GENERAL:  He is alert, oriented, and cooperative.    VITAL SIGNS:  His vital signs are stable.    HEENT:  Examination of his  head, extraocular muscles intact.  Pupils are   equally round.  NECK:  Trachea midline.  No masses, jugular venous distention, or   lymphadenopathy.  HEART:  Regular rate and rhythm.  LUNGS:  Clear to auscultation.  ABDOMEN:  Soft, nontender.  MUSCULOSKELETAL:  Examination of his spine reveals he has pain in the thoracic   spine at approximately T11.  Examination of his lower extremities, sensation   is intact in all dermatomes.  Motor strength is 5/5 in all muscle groups.    Reflexes 2/4.  He does have healed bilateral total knee arthroplasty scars.     DIAGNOSTIC DATA:  MRI shows T11 compression fracture.    PLAN:  At this point is to perform a T11 kyphoplasty.  I explained to him the   risks of bleeding, infection, scar, nerve damage, reoperation, failure of   operation.  There is also a risk of cement extravasation requiring removal of   cement.  He is familiar with these risks and would like to proceed.  He is on   the schedule today and is n.p.o.       ____________________________________     MD THIAGO NUNO / ANTONIO    DD:  07/18/2019 09:51:02  DT:  07/18/2019 10:21:27    D#:  [id number]  Job#:  [id number]

## 2019-07-18 NOTE — ANESTHESIA PROCEDURE NOTES
Airway  Date/Time: 7/18/2019 2:28 PM  Performed by: KAMINI AARON  Authorized by: KAMINI AARON     Location:  OR  Urgency:  Elective  Indications for Airway Management:  Anesthesia  Spontaneous Ventilation: absent    Sedation Level:  Deep  Preoxygenated: Yes    Patient Position:  Sniffing  Final Airway Type:  Endotracheal airway  Final Endotracheal Airway:  ETT  Cuffed: Yes    Technique Used for Successful ETT Placement:  Direct laryngoscopy  Insertion Site:  Oral  Blade Type:  Allyson  Laryngoscope Blade/Videolaryngoscope Blade Size:  3  ETT Size (mm):  8.0  Measured from:  Teeth  ETT to Teeth (cm):  22  Placement Verified by: auscultation and capnometry    Cormack-Lehane Classification:  Grade IIb - view of arytenoids or posterior of glottis only  Number of Attempts at Approach:  1        
Detail Level: Simple

## 2019-07-18 NOTE — ANESTHESIA PREPROCEDURE EVALUATION
Relevant Problems   (+) Acquired hypothyroidism   (+) Hypertension   (+) Paroxysmal atrial fibrillation (HCC)      Within Normal Limits   ANESTHESIA   GI      NEURO       Physical Exam    Airway   Mallampati: II  TM distance: >3 FB  Neck ROM: full       Cardiovascular - normal exam  Rhythm: regular  Rate: normal     Dental - normal exam         Pulmonary - normal exam  Breath sounds clear to auscultation     Abdominal - normal exam     Neurological - normal exam                 Anesthesia Plan    ASA 2       Plan - general       Airway plan will be ETT        Induction: intravenous          Informed Consent:    Anesthetic plan and risks discussed with patient.    Use of blood products discussed with: patient whom.

## 2019-07-18 NOTE — CONSULTS
Date of Service:  7/17/2019    PCP: Nestor Jiang M.D.    CC:  Back pain    HPI: This is a 87 y.o. male with intractable back pain. Hx of compression fractures. Denies falling or any trauma, but states the pain worse wit position. Denies any weakness numbness or tingling    ROS: As above. The remainder of a complete review of systems is negative in all systems except as noted.    PMHx:  Active Ambulatory Problems     Diagnosis Date Noted   • Hypertension 07/22/2015   • Status post left knee replacement 03/21/2019   • Acquired hypothyroidism 04/11/2019   • GI bleed 04/11/2019   • Anemia due to acute blood loss 04/11/2019   • Paroxysmal atrial fibrillation (HCC) 04/11/2019     Resolved Ambulatory Problems     Diagnosis Date Noted   • GI (gastrointestinal bleed) recent 04/16/2011   • HYPOTENSION 04/16/2011   • Paroxysmal A-fib (HCC) 04/16/2011   • Anemia 04/16/2011   • Paroxysmal atrial fibrillation (HCC) 04/20/2011   • Dehydration, mild 04/27/2011   • Hypokalemia 04/28/2011     Past Medical History:   Diagnosis Date   • Arrhythmia    • Back pain    • Constipation    • Dental disorder    • Disorder of thyroid        SHx:  Social History     Social History   • Marital status:      Spouse name: N/A   • Number of children: N/A   • Years of education: N/A     Occupational History   • Not on file.     Social History Main Topics   • Smoking status: Former Smoker     Packs/day: 2.00     Years: 20.00     Types: Cigarettes     Quit date: 1/1/1966   • Smokeless tobacco: Never Used   • Alcohol use 7.0 oz/week     14 Glasses of wine per week      Comment: 2/day   • Drug use: No   • Sexual activity: Not Currently     Partners: Female     Other Topics Concern   • Not on file     Social History Narrative   • No narrative on file       FHx:  family history includes Cancer in his mother; Heart Disease in his father.    Allergies:  Allergies   Allergen Reactions   • Nkda [No Known Drug Allergy]        Medications:  No  current facility-administered medications on file prior to encounter.      Current Outpatient Prescriptions on File Prior to Encounter   Medication Sig Dispense Refill   • amiodarone (PACERONE) 100 MG tablet Take 100 mg by mouth every bedtime.     • CALCIUM PO Take 1 Tab by mouth every morning.     • levothyroxine (SYNTHROID) 50 MCG Tab Take 50 mcg by mouth Every morning on an empty stomach.     • Multiple Vitamin (MULTI-VITAMIN PO) Take 1 Tab by mouth every day.     • docosahexanoic acid (OMEGA 3) 1000 MG CAPS Take 1,000 mg by mouth every morning.         Objective Exam:  Vitals:    07/17/19 0849 07/17/19 1348   BP: 135/85    Pulse: 85 72   Resp: 18    Temp: 36.3 °C (97.3 °F)    TempSrc: Temporal    SpO2: 95% 99%   Weight: 81 kg (178 lb 9.2 oz)    Height: 1.829 m (6')        General: WNWD, male  HEENT: EOMI, PERRLBL  Ext: 5/5 MS x 4  Neuro: neuro intact, pain in back around T11 region      Laboratory--reviewed personally and are as follows:  Lab Results   Component Value Date/Time    WBC 5.4 07/17/2019 12:10 PM    WBC 4.7 05/30/2012 08:49 AM    RBC 4.30 (L) 07/17/2019 12:10 PM    RBC 4.62 05/30/2012 08:49 AM    HEMOGLOBIN 13.8 (L) 07/17/2019 12:10 PM    HEMATOCRIT 41.4 (L) 07/17/2019 12:10 PM    MCV 96.3 07/17/2019 12:10 PM    MCV 99 (H) 05/30/2012 08:49 AM    MCH 32.1 07/17/2019 12:10 PM    MCH 32.9 05/30/2012 08:49 AM    MCHC 33.3 (L) 07/17/2019 12:10 PM    MPV 10.3 07/17/2019 12:10 PM    NEUTSPOLYS 73.30 (H) 07/17/2019 12:10 PM    LYMPHOCYTES 15.80 (L) 07/17/2019 12:10 PM    MONOCYTES 7.40 07/17/2019 12:10 PM    EOSINOPHILS 2.20 07/17/2019 12:10 PM    BASOPHILS 0.70 07/17/2019 12:10 PM      Lab Results   Component Value Date/Time    SODIUM 139 07/17/2019 12:10 PM    POTASSIUM 4.3 07/17/2019 12:10 PM    CHLORIDE 107 07/17/2019 12:10 PM    CO2 23 07/17/2019 12:10 PM    GLUCOSE 86 07/17/2019 12:10 PM    BUN 30 (H) 07/17/2019 12:10 PM    CREATININE 1.43 (H) 07/17/2019 12:10 PM    CREATININE 1.4 06/19/2007 03:10 PM     BUNCREATRAT 22 12/12/2013 07:58 AM    BUNCREATRAT 22 12/12/2013 07:58 AM      Lab Results   Component Value Date/Time    PROTHROMBTM 14.6 04/11/2019 01:55 PM    INR 1.13 04/11/2019 01:55 PM      Assessment/Plan:  Principal Problem:    Non-traumatic compression fracture of T11 thoracic vertebra (HCC) POA: Yes  Active Problems:    Non-traumatic compression fracture of T6 thoracic vertebra (HCC) POA: Yes    Hypertension POA: Yes    Acquired hypothyroidism POA: Yes    Paroxysmal atrial fibrillation (HCC) POA: Yes    Constipation POA: Yes  Resolved Problems:    * No resolved hospital problems. *    Acute Compression Fracture of T11    MRI reviewed, the patient has degenerative spondylosis with an acute T11 compression fracture. I suspect the pain is coming from that region. Pt states the pain is intolerable and wishes to have a kyphoplasty. I will attempt to schedule it for tomorrow 7/18/2019. However if no OR time available or I am unable to perform the procedure then I will consult IR to perform if possible. Further recommendations to follow in the morning.

## 2019-07-19 NOTE — DISCHARGE INSTRUCTIONS
Discharge Instructions    Discharged to home by car with relative. Discharged via wheelchair, hospital escort: Yes.  Special equipment needed: Not Applicable    Be sure to schedule a follow-up appointment with your primary care doctor or any specialists as instructed.     Discharge Plan:   Diet Plan: Discussed  Activity Level: Discussed  Confirmed Follow up Appointment: Patient to Call and Schedule Appointment  Confirmed Symptoms Management: Discussed  Medication Reconciliation Updated: Yes  Influenza Vaccine Indication: Indicated: Not available from distributor/    I understand that a diet low in cholesterol, fat, and sodium is recommended for good health. Unless I have been given specific instructions below for another diet, I accept this instruction as my diet prescription.   Other diet: Regular Diet    Special Instructions: None    · Is patient discharged on Warfarin / Coumadin?   No     Depression / Suicide Risk    As you are discharged from this [facility] facility, it is important to learn how to keep safe from harming yourself.    Recognize the warning signs:  · Abrupt changes in personality, positive or negative- including increase in energy   · Giving away possessions  · Change in eating patterns- significant weight changes-  positive or negative  · Change in sleeping patterns- unable to sleep or sleeping all the time   · Unwillingness or inability to communicate  · Depression  · Unusual sadness, discouragement and loneliness  · Talk of wanting to die  · Neglect of personal appearance   · Rebelliousness- reckless behavior  · Withdrawal from people/activities they love  · Confusion- inability to concentrate     If you or a loved one observes any of these behaviors or has concerns about self-harm, here's what you can do:  · Talk about it- your feelings and reasons for harming yourself  · Remove any means that you might use to hurt yourself (examples: pills, rope, extension cords,  firearm)  · Get professional help from the community (Mental Health, Substance Abuse, psychological counseling)  · Do not be alone:Call your Safe Contact- someone whom you trust who will be there for you.  · Call your local CRISIS HOTLINE 290-3246 or 808-966-5660  · Call your local Children's Mobile Crisis Response Team [place] (839) 265-3466 or www.[place]  · Call the toll free National Suicide Prevention Hotlines   · National Suicide Prevention Lifeline 200-586-TVEH (6128)  · National Hope Line Network 800-SUICIDE (939-5194)

## 2019-07-19 NOTE — DISCHARGE SUMMARY
Discharge Summary    CHIEF COMPLAINT ON ADMISSION  Chief Complaint   Patient presents with   • Low Back Pain     Pt reports low back pain for 3 days, pt denies injury, pain to radiate out. pt denies dysuria/fever/chills. pt reporting unable to sleep or find comfort.        Reason for Admission  Lower back pain     Admission Date  7/17/2019    CODE STATUS  Full Code    HPI & HOSPITAL COURSE    This is a 86 y/o M with PMHX of A.fibb not on AC due previous GI bleed. Was admitted on 7/17/19 after presenting to ER complaining of back pain. Pt states that about a month ago, he developed mid back pain that has been worsening.  He states he did not have any trauma and denies falls. His back pain has become unbearable therefore he presented the emergency room where a CAT scan reveals a compression fracture of T6 and T11.  Neurosurgery was consulted from the emergency room for possible kyphoplasty. Pt had kyphoplasty done today and tolerated procedure well. Ptt now feels well and would like to go home.  Pt will be discharged hoe today     The patient met 2-midnight criteria for an inpatient stay at the time of discharge.    Discharge Date  7/18/19    FOLLOW UP ITEMS POST DISCHARGE  Ortho: Dr. Sainz     DISCHARGE DIAGNOSES  Principal Problem:    Non-traumatic compression fracture of T11 thoracic vertebra (HCC) POA: Yes  Active Problems:    Non-traumatic compression fracture of T6 thoracic vertebra (HCC) POA: Yes    Acquired hypothyroidism POA: Yes    Paroxysmal atrial fibrillation (HCC) POA: Yes    Constipation POA: Yes  Resolved Problems:    * No resolved hospital problems. *      FOLLOW UP  No future appointments.  No follow-up provider specified.    MEDICATIONS ON DISCHARGE     Medication List      START taking these medications      Instructions   oxyCODONE immediate-release 5 MG Tabs  Commonly known as:  ROXICODONE   Take 1 Tab by mouth every four hours as needed for up to 4 days.  Dose:  5 mg        CONTINUE taking these  medications      Instructions   amiodarone 100 MG tablet  Commonly known as:  PACERONE   Take 100 mg by mouth every bedtime.  Dose:  100 mg     CALCIUM PO   Take 1 Tab by mouth every morning.  Dose:  1 Tab     docosahexanoic acid 1000 MG Caps  Commonly known as:  OMEGA 3 FA   Take 1,000 mg by mouth every morning.  Dose:  1000 mg     levothyroxine 50 MCG Tabs  Commonly known as:  SYNTHROID   Take 50 mcg by mouth Every morning on an empty stomach.  Dose:  50 mcg     MULTI-VITAMIN PO   Take 1 Tab by mouth every day.  Dose:  1 Tab     omeprazole 20 MG delayed-release capsule  Commonly known as:  PRILOSEC   Take 20 mg by mouth every day.  Dose:  20 mg     Vitamin C 1000 MG Tabs   Take 1,000 mg by mouth every morning.  Dose:  1000 mg            Allergies  Allergies   Allergen Reactions   • Nkda [No Known Drug Allergy]        DIET  Orders Placed This Encounter   Procedures   • Diet NPO     Standing Status:   Standing     Number of Occurrences:   8     Order Specific Question:   Restrict to:     Answer:   Sips with Medications [3]       ACTIVITY  As tolerated.  Toe touch RIGHT leg    CONSULTATIONS  Neurosurgery/Ortho     PROCEDURES  kyphoplasty     LABORATORY  Lab Results   Component Value Date    SODIUM 139 07/17/2019    POTASSIUM 4.3 07/17/2019    CHLORIDE 107 07/17/2019    CO2 23 07/17/2019    GLUCOSE 86 07/17/2019    BUN 30 (H) 07/17/2019    CREATININE 1.43 (H) 07/17/2019    CREATININE 1.4 06/19/2007        Lab Results   Component Value Date    WBC 5.4 07/17/2019    WBC 4.7 05/30/2012    HEMOGLOBIN 13.8 (L) 07/17/2019    HEMATOCRIT 41.4 (L) 07/17/2019    PLATELETCT 195 07/17/2019        Total time of the discharge process exceeds 39 minutes.

## 2019-12-12 NOTE — ED NOTES
Patient:   DIMITRIS CHASE            MRN: [id number]            FIN: [id number]              Age:   81 years     Sex:  MALE     :  38   Associated Diagnoses:   None   Author:   GRISEL SALGADO     Chief Complaint   Noted to have increasing pause on telemetry.  Was transferred to SICU for closer observation.  Patient asymptomatic.     Review of Systems   Constitutional:  Negative except as documented in history of present illness.   Eye:  Negative except as documented in history of present illness.   Ear/Nose/Mouth/Throat:  Negative except as documented in history of present illness.   Respiratory:  Negative except as documented in history of present illness.   Cardiovascular:  Negative except as documented in history of present illness.   Gastrointestinal:  Negative except as documented in history of present illness.   Genitourinary:  Negative except as documented in history of present illness.   Hematology/Lymphatics:  Negative except as documented in history of present illness.   Endocrine:  Negative except as documented in history of present illness.   Immunologic:  Negative except as documented in history of present illness.   Musculoskeletal:  Negative except as documented in history of present illness.   Integumentary:  Negative except as documented in history of present illness.   Neurologic:  Negative except as documented in history of present illness.   Psychiatric:  Negative except as documented in history of present illness.      Health Status   Allergies:    Allergic Reactions (All)  NKA  Canceled/Inactive Reactions (All)  Severity Not Documented  NKA- No reactions were documented.  NKA,- No reactions were documented.  No known drug allergies- No reactions were documented.   Current medications:    Medications (14) Active  Scheduled: (6)  Aspirin 81 mg DR tab  81 mg 1 tab, Oral, Daily  Atorvastatin 20 mg tab  20 mg 1 tab, Oral, Daily  ceFAZolin  2,000 mg 15 mL, Slow IV Push, On Call  Med rec complete per ptYanni PARISI   - Send to OR  Furosemide 40 mg/4 mL inj  40 mg 4 mL, Slow IV Push, BID  Oxybutynin 5 mg XL tab 24 hr  10 mg 2 tab, Oral, Daily  Tamsulosin 0.4 mg cap  0.4 mg 1 cap, Oral, Daily  Continuous: (1)  DOPamine 800 mg [5 mcg/kg/min] + premixed in Dextrose 5% 250 mL  250 mL, IV, 14.27 mL/hr  PRN: (7)  Acetaminophen 500 mg tab  500 mg 1 tab, Oral, Q4H  Aluminum-magnesium hydrox-simethicone SS 30 mL oral susp UD  15 mL, Oral, Q4H  Docusate sodium 100 mg cap  100 mg 1 cap, Oral, BID  Melatonin 3 mg tab  3 mg 1 tab, Oral, Q Bedtime  Ondansetron 4 mg/2 mL inj SDV  4 mg 2 mL, Slow IV Push, Q6H  Polyethylene glycol 3350 oral recon powder 17 gm packet UD  17 gm 1 packet, Oral, Daily  Simethicone 80 mg chew tab  80 mg 1 tab, Chewed, TID [with meals]      Histories   Past Medical History: Problem List / Past Medical History   A-fib  Afib  Asthma  Congestive heart failure  Diabetes  Diabetes  Gout  Hypercholesterolemia  Hyperlipidemia  Hypertension  Hypertension  Morbid obesity  Sleep apnea    Family History:    SISTER  Diabetes  BROTHER  CA - Cancer of colon  Diabetes  MOTHER  Diabetes  CA - Cancer  FATHER  Diabetes  Stroke    Social History       Alcohol  Details: Use: Current.  Frequency: social.  Substance Abuse  Details: Use: None.  Tobacco  Details: Smoked/Smokeless Tobacco Last 30 Days: Yes.  Use: Current some day smoker.  Type: \"chew\".  .       Physical Examination   VS/Measurements     Vitals between:   11-DEC-2019 13:54:00   TO   12-DEC-2019 13:54:00                   LAST RESULT MINIMUM MAXIMUM  Temperature 36.4 36.2 36.6  Heart Rate 85 36 85  Respiratory Rate 17 16 21  NISBP           135 81 188  NIDBP           76 44 163  NIMBP           94 94 171  SpO2                    93 93 100  Pressure 143/72, heart rate 74 at rest, afebrile.   Intake and Output   I & O between:  11-DEC-2019 13:54 TO 12-DEC-2019 13:54  Med Dosing Weight:  152.2  kg   11-DEC-2019  24 Hour Intake:   8.00  ( 0.05 mL/kg )  24 Hour Output:   200.00            24 Hour Urine/Stool Output:   0.0  24 Hour Balance:   -192.00           24 Hour Urine Output:   200.00  ( 0.05 mL/kg/hr )     General:  Alert and oriented, No acute distress.    Eye:  Pupils are equal, round and reactive to light, Extraocular movements are intact, Normal conjunctiva.   HENT:  Normal hearing, Oral mucosa is moist.    Neck:  Supple, Non-tender, No carotid bruit, No jugular venous distention, No lymphadenopathy.   Respiratory:  Lungs are clear to auscultation, Respirations are non-labored, Breath sounds are equal, Symmetrical chest wall expansion.   Cardiovascular:  Normal rate, Irregular rhythm, Frequent pauses.   Gastrointestinal:  Soft, Non-tender, Non-distended, Normal bowel sounds.   Genitourinary:  No costovertebral angle tenderness, No inguinal tenderness.   Musculoskeletal     Normal range of motion.     No swelling.     No deformity.     Integumentary:  Warm, Pink, Moist.    Neurologic:  Alert, Oriented, No focal deficits, Cranial Nerves II-XII are grossly intact.   Psychiatric:  Cooperative, Appropriate mood & affect.      Review / Management   Results review:     Labs between:  11-DEC-2019 13:54 to 12-DEC-2019 13:54  POC GLU:                 Latest Result  Latest Date  Minimum  Min Date  Maximum  Max Date                             (H) 110  12-DEC-2019 (H) 110  12-DEC-2019 (H) 131  11-DEC-2019  COAG:                 INR  PT  PTT  Ddimer  Fibrinogen    12-DEC-2019 1.5  (H) 15.5                        .    Radiology results   ECHO,    , Echocardiogram Results  STUDY CONCLUSIONSSUMMARY:1. Left ventricle: The cavity size is normal. Wall thickness is moderately   to severely increased. There is concentric hypertrophy. Systolic   function is normal. The estimated ejection fraction is 60-65%, by   visual assessment. Wall motion is normal; there are no regional wall   motion abnormalities. Doppler parameters are consistent with a   reversible restrictive pattern, indicative of decreased left    ventricular  diastolic compliance and/or increased left atrial pressure   (grade 3 diastolic dysfunction).2. Left atrium: The atrium is moderately dilated.3. Right ventricle: The cavity size is moderately dilated. Wall thickness   is normal. Systolic pressure is moderately increased. The estimated   peak pressure is 47mm Hg.4. Right atrium: The atrium is severely dilated.      Cardiology Results        Left ventricular ejection fraction:   Result title:  CD ECHO 2D COMPLETE W DOP AND COLOR-ADLT  Result status:  Final  Verified by:  SARAH LAZO on 12/12/2019 9:23  SUMMARY:1. Left ventricle: The cavity size is normal. Wall thickness is moderately   to severely increased. There is concentric hypertrophy. Systolic   function is normal. The estimated ejection fraction is 60-65%, by   visual assessment. Wall motion is normal; there are no regional wall   motion abnormalities. Doppler parameters are consistent with a   reversible restrictive pattern, indicative of decreased left   ventricular diastolic  compliance and/or increased left atrial pressure   (grade 3 diastolic dysfunction).2. Left atrium: The atrium is moderately dilated.3. Right ventricle: The cavity size is moderately dilated. Wall thickness   is normal. Systolic pressure is moderately increased. The estimated   peak pressure is 47mm Hg.4. Right atrium: The atrium is severely dilated.--------------------------------------------------------------------------STUDY DATA:    Procedure:  Transthoracic echocardiography was performed.Image quality was adequate.  M-mode, complete 2D, complete spectralDoppler, and color Doppler.  Study status:  Routine.  Study completion:There were no complications.FINDINGSLEFT VENTRICLE:  The cavity size is normal. Wall thickness is moderatelyto severely increased. There is concentric hypertrophy. Systolic functionis normal. The estimated ejection fraction is 60-65%, by  visualassessment. Wall motion is normal; there are no  regional wall motionabnormalities. The tissue Doppler parameters are abnormal c/w elevated LApressure. Doppler parameters are consistent with a reversible restrictivepattern, indicative of decreased left ventricular diastolic complianceand/or increased left atrial pressure (grade 3 diastolic dysfunction).AORTIC VALVE:  Well visualized.  The valve is trileaflet. The leaflets aremildly  calcified. Cusp separation is normal.  Doppler:   There is nostenosis.    Trivial regurgitation.    The mean systolic gradient is 8mmHg. The peak systolic gradient is 15mm Hg.MITRAL VALVE:  Well visualized.  Structurally normal valve.   Leafletseparation is normal.  Doppler:  Transvalvular velocity is within thenormal range. There is no evidence for stenosis.  Mild to moderateregurgitation.    The peak diastolic gradient is 8mm Hg.ATRIAL  SEPTUM:   Color doppler shows no obvious shunt.LEFT ATRIUM:  Well visualized. The atrium is moderately dilated.RIGHT VENTRICLE:  The cavity size is moderately dilated. Wall thickness isnormal. Systolic function is normal. Systolic pressure is moderatelyincreased. The estimated peak pressure is 47mm Hg.VENTRICULAR SEPTUM:    There is no evidence of a ventricular septaldefect.PULMONIC VALVE:   Not well visualized.  Structurally normal  valve.Doppler:  Transvalvular velocity is within the normal range. There is noevidence for stenosis.  Mild to moderate regurgitation.TRICUSPID VALVE:  Well visualized.  Doppler:   Mild-moderateregurgitation.RIGHT ATRIUM:  Well visualized. The atrium is severely dilated.PERICARDIUM:  There is no pericardial effusion.SYSTEMIC VEINS:Inferior vena cava: The vessel is dilated. The respirophasic diameterchanges are blunted (less than 50%).BASELINE  ECG:   Normal sinus rhythm.--------------------------------------------------------------------------Measurements Left ventricle                 Value        Ref        Left atrium              Value         Ref JOHNATHON, LAX chord                  4.5   cm     4.2 - 5.8  AP dim, ES         (H)   5.2    cm     3.0 - 4.0 ESD, LAX chord         (L)     2.3   cm     2.5 - 4.0  AP dim index             1.8    cm/m 1.5 - 2.3 JOHNATHON/bsa, LAX chord     (L)      1.5   cm/m 2.2 - 3.0  Area ES, A4C       (H)   33     cm   <=20 ESD/bsa, LAX chord     (L)     0.8   cm/m 1.3 - 2.1  Vol, S             (H)   95     ml     18 - 58 Mid-wall FS, LAX chord (L)     12    %      14 - 22    Vol/bsa, S               32     ml/m 16 - 34 PW, ED, LAX            (H)     1.7   cm     0.6 - 1.0 IVS/PW, ED, LAX                0.96         ---------  Aortic valve             Value         Ref PW, ED                 (H)      1.7   cm     0.6 - 1.0  Peak v, S                1.9    m/sec  --------- IVS/PW, ED                     0.96         ---------  Mean v, S                1.28   m/sec  --------- EDV                            91    ml     62 - 150   Mean grad, S             8      mm Hg  --------- ESV                    (L)     18    ml     21 - 61    Peak grad, S             15     mm Hg  --------- SV                             72    ml     --------- EDV/bsa                 (L)     31    ml/m 34 - 74    Mitral valve             Value         Ref ESV/bsa                (L)     6     ml/m 11 - 31    Peak E                   1.42   m/sec  --------- SV/bsa                         25    ml/m ---------  Peak A                   0      m/sec  --------- ESV, 1-p A4C                   38    ml     22 - 78    Decel time               232    ms     --------- SV, 1-p A4C                    72    ml     ---------   Peak grad, D             8      mm Hg  --------- ESV/bsa, 1-p A4C               13    ml/m 12 - 40    Peak E/A ratio           459.06        --------- SV/bsa, 1-p A4C                25    ml/m --------- EDV, 2-p                       111   ml     62 - 150   Tricuspid valve          Value         Ref EDV/bsa, 2-p                   38    ml/m 34 - 74    TR peak v                2.8     m/sec  <=2.8                                                         Peak RV-RA grad, S       32     mm Hg  --------- LVOT                           Value        Ref Diam, S                        1.9   cm     ---------  Pulmonary artery         Value         Ref Area                           2.9   cm   ---------  Pressure, S              47     mm Hg  --------- Ventricular septum             Value        Ref        Systemic veins           Value         Ref IVS, ED, LAX           (H)     1.7   cm      0.6 - 1.0  Estimated CVP            15     mm Hg  --------- IVS, ED                (H)     1.7   cm     0.6 - 1.0 Right ventricle                Value        Ref JOHNATHON, LAX                       3.1   cm     --------- Pressure, S                    47    mm Hg  ---------Legend:(L)  and  (H)  rene values outside specified reference range.Prepared and electronically signed bySemaj Stiles MD12/12/2019 09:23     Lines and Tubes:    LINES  Peripheral Intravenous Antecubital Right   Gauge: 20   Charted: 12/12/19 10:00  Inserted: 12/09/19   Days Since Insertion: 3 days  Indication of Use: IV Meds   .      Impression and Plan   Diagnosis   Afib  -Afib with SVR, HR 40  -Warfarin on hold at this time due to PPM insertion on tomorrow; will resume postprocedure  -Pt is Asymptomatic  -TSH WNL  -Hold all AVNBs  Bradycardia  -slow afib  -presumably symptomatic as there are no other illiciting factors to explain 3 days of symptoms  -Echocardiogram with normal LV function and LVH; no wall motion abnormalities; will initiate beta-blocker therapy post PPM  -Pauses increasing in frequency; will start dopamine drip as per cardiologist; if pauses continue, will consider transvenous pacing  -PPM implantation tomorrow; n.p.o. at midnight  Elevated troponin  -Nonspecific  –And absent chest pain  –EKG without evidence of acute injury or ischemia as per cardiologist  –Echocardiogram as noted above  -[place] elevated secondary to CKD and mild  volume overload  Diastolic heart failure  -Appears compensated on exam  -Echocardiogram as above  -On IV Lasix, will change to oral soon  -Goal-directed medical therapy is on hold at this time.  Will initiate beta-blocker therapy post PPM insertion  DM  -We will defer to primary services  CKD  -Stable  Addendum to the Consultation / Daily Progress Note by ANA Pedraza  I have participated in this patient's care as follows: Evaluation and management services.  Case discussed with physician assistant/nurse practitioner: Yes.   I agree with the evaluation and management services provided by the physician assistant/nurse practitioner with the following exceptions: None.  I agree with the interpretation of studies documented by the physician assistant/nurse practitioner with the following exceptions: None.  Notes: After a complete review of pertinent patient findings and an independent exam, I agree in all respects with the evaluation, treatment, and disposition as documented.  hr responded to dopamine gtt.  plan for ppm in am.  npo after midnight.  Viral Harding MD  [phone]  [facility]  [facility]--Cardiology

## 2021-10-12 NOTE — ED NOTES
"Patient c/o 10/10 rectal pain and diarrhea with \"leakage\", denies blood in stool, denies N/V, denies abdominal pain.    "
Chief Complaint   Patient presents with   • Rectal Pain     reports symptoms for five days/ pt reports started as constipation/ took mirilax and now having diarrhea/ rectal pain   • Diarrhea   • Constipation     Explained to pt triage process, made pt aware to tell this RN of any changes/concerns, pt verbalized understanding of process and instructions given. Pt to ER lobby.    
ERP at bedside for disimpaction with RN assist.  
ERP at bedside.    
Enema administered per MAR.  Patient sitting on BSC.  Significant other at bedside.  Call light within reach.   
MD resident at bedside for rectal exam with RN assist.  
PIV removed.  Patient and family given discharge instructions and follow up information, verbalized understanding, prescriptions x 3 electronically sent to pharmacy, location verified, discharged to family via wheelchair.    
Patient ambulatory to BL 17 with cane, steady gait, significant other at bedside, changing into gown, chart up for ERP.    
Patient cleaned of stool, lidocaine jelly applied, patient and significant other updated on POC.    
Patient continues sitting on BSC.  No BM, yet.  ERP called and notified.  Orders received.    
Patient had large, brown, BM on bedside commode.    
Patient transported to Xray.    
Pharmacy called for lidocaine jelly  
Resident at bedside.    
Up to the BS.   
Yes...

## 2022-07-06 NOTE — ED NOTES
GI MD at bedside. Blood transfusion continues.    Complex Repair And Z Plasty Text: The defect edges were debeveled with a #15 scalpel blade.  The primary defect was closed partially with a complex linear closure.  Given the location of the remaining defect, shape of the defect and the proximity to free margins a Z plasty was deemed most appropriate for complete closure of the defect.  Using a sterile surgical marker, an appropriate advancement flap was drawn incorporating the defect and placing the expected incisions within the relaxed skin tension lines where possible.    The area thus outlined was incised deep to adipose tissue with a #15 scalpel blade.  The skin margins were undermined to an appropriate distance in all directions utilizing iris scissors.

## 2024-03-22 NOTE — THERAPY
"Occupational Therapy Evaluation completed.   Functional Status:  Pt seated in chair upon arrival.  When pt stood to pull up pants (pt not using FWW for balance), pt had LOB backwards and fell back into chair.  Pt required SBA to complete LB dressing.  Supervision to complete UB dressing.  Pt attempted to walk to bathroom without FWW and again had LOB backwards requiring assist to regain balance. Pt strongly encouraged to use FWW at home during standing ADL's and functional mobility.  Pt eager to DC home.  Plan of Care: Patient with no further skilled OT needs in the acute care setting at this time  Discharge Recommendations:  Equipment: No Equipment Needed.     Pt is 86 y/o M seen for OT evaluation. Pt underwent L TKA. Pt provided education on ADL's/adaptive strategies, AE, and ADL transfers. Pt is slightly impulsive and overly confident without FWW. Pt did have 2 LOB during session; pt strongly encouraged to use FWW at this time for safety. Pt eager to DC home today with support from spouse. Pt with no further acute OT needs.    See \"Rehab Therapy-Acute\" Patient Summary Report for complete documentation.    "
"Physical Therapy Evaluation completed.   Bed Mobility:  Supine to Sit:  (up in chair)  Transfers: Sit to Stand: Supervised  Gait: Level Of Assist: Supervised with Front-Wheel Walker       Plan of Care: Patient with no further skilled PT needs in the acute care setting at this time  Discharge Recommendations: Equipment: No Equipment Needed. Post-acute therapy Recommend outpatient  services for continued physical therapy services.    Mr. Auguste is an 86 y/o male who presents to acute secondary to elective L TKA. He presents with mild L knee ROM and strength deficits that result in dynamic balance deficits and altered gait pattern. FWW remedied balance deficits. Cues for heel toe steps resulted in normalized gait pattern. Stair sequencing training provided. Pt able to perform gait, transfers, and stairs without physical assist. Provided with TKA  therex handout, with visual, tactile, and verbal cues pt able to demonstrate proper performance of these. No additional acute physical therapy needs. Recommend outpatient PT to address ROM and strength deficits.     See \"Rehab Therapy-Acute\" Patient Summary Report for complete documentation.     "
all other ROS negative except as per HPI

## 2025-02-05 NOTE — CARE PLAN
Problem: Infection  Goal: Will remain free from infection  Outcome: PROGRESSING AS EXPECTED  Standard precautions in place. Hand hygiene completed before entering room and exiting room.     Problem: Bowel/Gastric:  Goal: Normal bowel function is maintained or improved  Outcome: PROGRESSING SLOWER THAN EXPECTED   04/13/19 2000   OTHER   Last BM 04/13/19   Number of Times Stooled 3       Pt is still having bloody stools. Pt has scheduled colonoscopy tomorrow       No difficulties

## (undated) DEVICE — SUCTION INSTRUMENT YANKAUER BULBOUS TIP W/O VENT (50EA/CA)

## (undated) DEVICE — HEAD HOLDER JUNIOR/ADULT

## (undated) DEVICE — KIT ANESTHESIA W/CIRCUIT & 3/LT BAG W/FILTER (20EA/CA)

## (undated) DEVICE — DRAPE LAPAROTOMY T SHEET - (12EA/CA)

## (undated) DEVICE — GLOVE BIOGEL INDICATOR SZ 8.5 SURGICAL PF LTX - (50/BX 4BX/CA)

## (undated) DEVICE — PACK TOTAL KNEE  (1/CA)

## (undated) DEVICE — DRAPE 36X28IN RAD CARM BND BG - (25/CA) O

## (undated) DEVICE — NEPTUNE 4 PORT MANIFOLD - (20/PK)

## (undated) DEVICE — CATHETER IV 20 GA X 1-1/4 ---SURG.& SDS ONLY--- (50EA/BX)

## (undated) DEVICE — SUTURE 3-0 MONOCRYL PLUS PS-1 - 27 INCH (36/BX)

## (undated) DEVICE — SUTURE 2-0 VICRYL PLUS CT-1 - 8 X 18 INCH(12/BX)

## (undated) DEVICE — CLOSURE SKIN STRIP 1/2 X 4 IN - (STERI STRIP) (50/BX 4BX/CA)

## (undated) DEVICE — FILM CASSETTE ENDO

## (undated) DEVICE — SODIUM CHL IRRIGATION 0.9% 1000ML (12EA/CA)

## (undated) DEVICE — PROTECTOR ULNA NERVE - (36PR/CA)

## (undated) DEVICE — WATER IRRIG. STER. 1500 ML - (9/CA)

## (undated) DEVICE — GOWN WARMING STANDARD FLEX - (30/CA)

## (undated) DEVICE — LENS/HOOD FOR SPACESUIT - (32/PK) PEEL AWAY FACE

## (undated) DEVICE — LACTATED RINGERS INJ 1000 ML - (14EA/CA 60CA/PF)

## (undated) DEVICE — ELECTRODE DUAL RETURN W/ CORD - (50/PK)

## (undated) DEVICE — STOCKINET TUBULAR 6IN STERILE - 6 X 48YDS (25/CA)

## (undated) DEVICE — BITEBLOCK ENDOSCOPIC PEDI. - (25/BX)

## (undated) DEVICE — CONTAINER, SPECIMEN, STERILE

## (undated) DEVICE — BLADE SURGICAL #11 - (50/BX)

## (undated) DEVICE — DRESSING TRANSPARENT FILM TEGADERM 2.375 X 2.75"  (100EA/BX)"

## (undated) DEVICE — TUBE NG SALEM SUMP 14FR (50EA/BX)

## (undated) DEVICE — SLEEVE, VASO, THIGH, MED

## (undated) DEVICE — SET LEADWIRE 5 LEAD BEDSIDE DISPOSABLE ECG (1SET OF 5/EA)

## (undated) DEVICE — DRAPE STRLE REG TOWEL 18X24 - (10/BX 4BX/CA)"

## (undated) DEVICE — BLADE SAW 90X25X1.37MM SAGITTAL DUAL CUT

## (undated) DEVICE — GLOVE BIOGEL ECLIPSE PF LATEX SIZE 8.5 (50PR/BX)

## (undated) DEVICE — MASK ANESTHESIA ADULT  - (100/CA)

## (undated) DEVICE — FORCEP RADIAL JAW 4 STANDARD CAPACITY W/NEEDLE 240CM (40EA/BX)

## (undated) DEVICE — DRESSING POST OP BORDER 4 X 10 (5EA/BX)

## (undated) DEVICE — GLOVE BIOGEL ECLIPSE PF LATEX SIZE 8.0  (50PR/BX)

## (undated) DEVICE — ARMREST CRADLE FOAM - (2PR/PK 12PR/CA)

## (undated) DEVICE — TUBE CONNECTING SUCTION - CLEAR PLASTIC STERILE 72 IN (50EA/CA)

## (undated) DEVICE — SENSOR SPO2 NEO LNCS ADHESIVE (20/BX) SEE USER NOTES

## (undated) DEVICE — KIT ROOM DECONTAMINATION

## (undated) DEVICE — BLOCK

## (undated) DEVICE — COVER MAYO STAND X-LG - (22EA/CA)

## (undated) DEVICE — CONTAINER SPECIMEN BAG OR - STERILE 4 OZ W/LID (100EA/CA)

## (undated) DEVICE — GOWN SURGICAL XX-LARGE - (28EA/CA) SIRUS NON REINFORCED

## (undated) DEVICE — GOWN SURGEONS X-LARGE - DISP. (30/CA)

## (undated) DEVICE — PACK MINOR BASIN - (2EA/CA)

## (undated) DEVICE — STERI STRIP COMPOUND BENZOIN - TINCTURE 0.6ML WITH APPLICATOR (40EA/BX)

## (undated) DEVICE — SET EXTENSION WITH 2 PORTS (48EA/CA) ***PART #2C8610 IS A SUBSTITUTE*****

## (undated) DEVICE — BLADE SAGITTAL SYSTEM 18MM

## (undated) DEVICE — MIXER BONE CEMENT REVOLUTION - W/FEMORAL PRESSURIZER (6/CA)

## (undated) DEVICE — TRAY EXPANDERINFLATABLE BONE TAMP FF 1-STEP WITH CDS KYPHOPAK 15/3

## (undated) DEVICE — CANISTER SUCTION RIGID RED 1500CC (40EA/CA)

## (undated) DEVICE — SYRINGE DISP. 12 CC LL - (100/BX)

## (undated) DEVICE — MANIFOLD NEPTUNE 1 PORT (20/PK)

## (undated) DEVICE — TIP INTPLS HFLO ML ORFC BTRY - (12/CS)  FOR SURGILAV

## (undated) DEVICE — TUBING CLEARLINK DUO-VENT - C-FLO (48EA/CA)

## (undated) DEVICE — SODIUM CHL. IRRIGATION 0.9% 3000ML (4EA/CA 65CA/PF)

## (undated) DEVICE — PAD LAP STERILE 18 X 18 - (5/PK 40PK/CA)

## (undated) DEVICE — ELECTRODE 850 FOAM ADHESIVE - HYDROGEL RADIOTRNSPRNT (50/PK)

## (undated) DEVICE — KIT CUSTOM PROCEDURE SINGLE FOR ENDO  (15/CA)

## (undated) DEVICE — BONE BIOPSY DEVICE

## (undated) DEVICE — CANISTER SUCTION 3000ML MECHANICAL FILTER AUTO SHUTOFF MEDI-VAC NONSTERILE LF DISP  (40EA/CA)

## (undated) DEVICE — CHLORAPREP 26 ML APPLICATOR - ORANGE TINT(25/CA)

## (undated) DEVICE — BLADE SURGICAL #15 - (50/BX 3BX/CA)

## (undated) DEVICE — LACTATED RINGERS INJ. 500 ML - (24EA/CA)

## (undated) DEVICE — TRAY SPINAL ANESTHESIA NON-SAFETY (10/CA)

## (undated) DEVICE — HEADREST PRONEVIEW LARGE - (10/CA)

## (undated) DEVICE — PAD UNIVERSAL MULTI USE (1/EA)

## (undated) DEVICE — BASIN EMESIS DISP. - (250/CA)

## (undated) DEVICE — MEDICINE CUP STERILE 2 OZ - (100/CA)

## (undated) DEVICE — SPONGE GAUZE NON-STERILE 4X4 - (2000/CA 10PK/CA)

## (undated) DEVICE — Device

## (undated) DEVICE — SPONGE GAUZESTER. 2X2 4-PL - (2/PK 50PK/BX 30BX/CS)

## (undated) DEVICE — SYRINGE 10 ML CONTROL LL (25EA/BX 4BX/CA)

## (undated) DEVICE — GLOVE BIOGEL SZ 8.5 SURGICAL PF LTX - (50PR/BX 4BX/CA)

## (undated) DEVICE — SUCTION TIP STRAIGHT ARGYLE - 50EA/CA

## (undated) DEVICE — SUTURE GENERAL

## (undated) DEVICE — HANDPIECE 10FT INTPLS SCT PLS IRRIGATION HAND CONTROL SET (6/PK)